# Patient Record
Sex: FEMALE | Race: WHITE | NOT HISPANIC OR LATINO | Employment: FULL TIME | URBAN - METROPOLITAN AREA
[De-identification: names, ages, dates, MRNs, and addresses within clinical notes are randomized per-mention and may not be internally consistent; named-entity substitution may affect disease eponyms.]

---

## 2017-03-01 ENCOUNTER — OFFICE VISIT (OUTPATIENT)
Dept: PSYCHOLOGY | Facility: CLINIC | Age: 52
End: 2017-03-01

## 2017-03-01 DIAGNOSIS — F32.1 MAJOR DEPRESSIVE DISORDER, SINGLE EPISODE, MODERATE (H): Primary | ICD-10-CM

## 2017-03-01 DIAGNOSIS — F43.22 ADJUSTMENT DISORDER WITH ANXIETY: ICD-10-CM

## 2017-03-01 ASSESSMENT — ANXIETY QUESTIONNAIRES
2. NOT BEING ABLE TO STOP OR CONTROL WORRYING: SEVERAL DAYS
IF YOU CHECKED OFF ANY PROBLEMS ON THIS QUESTIONNAIRE, HOW DIFFICULT HAVE THESE PROBLEMS MADE IT FOR YOU TO DO YOUR WORK, TAKE CARE OF THINGS AT HOME, OR GET ALONG WITH OTHER PEOPLE: VERY DIFFICULT
GAD7 TOTAL SCORE: 8
1. FEELING NERVOUS, ANXIOUS, OR ON EDGE: SEVERAL DAYS
7. FEELING AFRAID AS IF SOMETHING AWFUL MIGHT HAPPEN: SEVERAL DAYS
5. BEING SO RESTLESS THAT IT IS HARD TO SIT STILL: SEVERAL DAYS
3. WORRYING TOO MUCH ABOUT DIFFERENT THINGS: MORE THAN HALF THE DAYS
6. BECOMING EASILY ANNOYED OR IRRITABLE: SEVERAL DAYS

## 2017-03-01 ASSESSMENT — PATIENT HEALTH QUESTIONNAIRE - PHQ9: 5. POOR APPETITE OR OVEREATING: SEVERAL DAYS

## 2017-03-01 NOTE — PROGRESS NOTES
"Behavioral Health Progress Note    Client Name: Mary Gill    Service Type: Individual  Length of Visit: 40 minutes  Attendees: Patient    Identifying Information and Presenting Problem:  The patient is a 51 year old American female who is being seen for problematic anxious and depressive symptoms.    Treatment Objective(s) Addressed in This Session:  Review of symptoms/experiences since initial visit on 11/18/16  Adjustment Difficulties: Development of adaptive coping/problem-solving skills to facilitate more adaptive adjustment    Progress on / Status of Treatment Objective(s) / Homework:  Achieved/Completed  New Objective established this session     PHQ-9 SCORE 11/15/2016 11/18/2016 3/1/2017   Total Score 9 14 18     ELENA-7 SCORE 11/15/2016 11/18/2016 3/1/2017   Total Score 11 15 8     Topics Discussed/Interventions Provided:    Reviewed symptoms and experiences between visits. Initial visit with this provider was on 11/18/16, at which time a diagnostic assessment had been completed. 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 Reported feeling that \"people around [her] are falling apart.\" Indicated she had been experiencing significant difficulties with both sleep (waking up in the middle of the night) in addition to concentration. Reported feeling frustrated with \"not being able to concentrate\" and that \"it feels like [her] brain is so markedly different\" in regards to functioning. Provided brief psychoeducation regarding the impact of stress on concentration, which the patient reported finding beneficial.       Discussed the importance of self-care. Prompted the patient to reflect on recent self-care endeavors she had engaged " "in (e.g., Using a mindfulness application on her phone; [place] 2 [place] 3 [place]) and the protective impact of said endeavors. Appeared receptive to this and to have some insight. Agreed to increase her engagement in self-care activities/endeavors between current and next visit. Additionally, agreed to ponder treatment goals during that time as well. Expressed a desire to \"get help finding a direction.\"     Assessment: The patient appeared to be active and engaged in today's session and was receptive to feedback.     Mental Status: Mary Gill appeared generally alert and oriented. Dress was formal and appropriate to the weather and occasion. Grooming and hygiene were good. Eye contact was good. Speech was of normal volume and rate and was clear, coherent, and relevant. Mood was anxious with congruent affect. Thought processes were relevant, logical and goal-directed. Thought content was WNL with no evidence of psychotic or paranoid features. No evidence of SI/HI or self-harm, intent, or plans. Memory appeared grossly intact. Insight and judgment appeared good and patient exhibited good impulse control during the appointment.     Does the patient appear to be at imminent risk of harm to self/others at this time? No    The session was necessary to address anxious and depressive symptoms that have been interfering with patient's ability to function in academic settings. Ongoing psychotherapy is necessary to stabilize mood, provide counseling, provide psychoeducation and provide support.    Diagnosis (DSM-5):  296.22 Major Depressive Disorder, Single Episode, Moderate   309.24 Adjustment Disorder With anxiety    Plan:  1. Follow up in one week.  2. Work on goals as noted in patient instructions.  3. Utilize crisis resources as needed.      Nathaniel Lombardi, Ph.D.  Behavioral Health Fellow      NOTE: Treatment plan to be completed during next visit.  Diagnostic assessment update due " 11/18/2017.

## 2017-03-01 NOTE — MR AVS SNAPSHOT
After Visit Summary   3/1/2017    Mary Gill    MRN: [id number]           Patient Information     Date Of Birth          1965        Visit Information        Provider Department      3/1/2017 4:00 PM Lombardi, Nathaniel, PhD [facility]        Care Instructions    -[place] directions/horizons you want to steer your ship towards.    -Engage in self-care between visits, even small things (meditation; pedicure; go for walks).    -Give yourself permission to be human.    -Schedule two or three visits with Dashawn (weekly if possible).    Crisis Numbers     Crisis Connection - [phone]   COPE - [phone] ([facility] Mobile Response Team)    [facility] [phone] (Emergency Psychiatric Evaluation)     Or call 911         Follow-ups after your visit        Who to contact     Please call your clinic at [phone] to:    Ask questions about your health    Make or cancel appointments    Discuss your medicines    Learn about your test results    Speak to your doctor   If you have compliments or concerns about an experience at your clinic, or if you wish to file a complaint, please contact [facility] Patient Relations at [phone] or email us at [email or web address]         Additional Information About Your Visit        MyChart Information     [employer] gives you secure access to your electronic health record. If you see a primary care provider, you can also send messages to your care team and make appointments. If you have questions, please call your primary care clinic.  If you do not have a primary care provider, please call [phone] and they will assist you.      [employer] is an electronic gateway that provides easy, online access to your medical records. With [employer], you can request a clinic appointment, read your test results, renew a prescription or communicate with your care team.     To access your existing  account, please contact your [facility] or call [phone] for assistance.        Care EveryWhere ID     This is your Care EveryWhere ID. This could be used by other organizations to access your [place] medical records  [id number]         Blood Pressure from Last 3 Encounters:   11/15/16 124/77   06/28/16 121/85   03/24/16 128/81    Weight from Last 3 Encounters:   11/15/16 86.6 kg (191 lb)   06/28/16 87.8 kg (193 lb 9.6 oz)   03/24/16 88.9 kg (196 lb)              Today, you had the following     No orders found for display       Primary Care Provider Office Phone # Fax #    Ana Campos [phone]       [facility] [address]  [facility] [address]        Thank you!     Thank you for choosing [facility]  for your care. Our goal is always to provide you with excellent care. Hearing back from our patients is one way we can continue to improve our services. Please take a few minutes to complete the written survey that you may receive in the mail after your visit with us. Thank you!             Your Updated Medication List - Protect others around you: Learn how to safely use, store and throw away your medicines at www.disposemymeds.org.          This list is accurate as of: 3/1/17  4:34 PM.  Always use your most recent med list.                   Brand Name Dispense Instructions for use    albuterol 108 (90 BASE) MCG/ACT Inhaler    PROAIR HFA/PROVENTIL HFA/VENTOLIN HFA    1 Inhaler    Inhale 2 puffs into the lungs 4 times daily       DiphenhydrAMINE HCl 2 % Gel    BENADRYL ITCH STOPPING    118 mL    Apply to the affected area       hydrocortisone 1 % cream    CORTAID    30 g    Apply sparingly to affected area three times daily for 14 days.       hydrOXYzine 25 MG tablet    ATARAX    30 tablet    Take 1-2 tablets (25-50 mg) by mouth nightly as needed for anxiety (insomnia)       SUMAtriptan 50 MG tablet    IMITREX    18  tablet    Take 1 tablet (50 mg) by mouth at onset of headache for migraine May repeat dose in 2 hours.  Do not exceed 200 mg in 24 hours       ZYRTEC ALLERGY 10 MG Caps   Generic drug:  cetirizine HCl      Take  by mouth.

## 2017-03-01 NOTE — PATIENT INSTRUCTIONS
-Mark directions/horizons you want to steer your ship towards.    -Engage in self-care between visits, even small things (meditation; pedicure; go for walks).    -Give yourself permission to be human.    -Schedule two or three visits with Dashawn (weekly if possible).    Crisis Numbers     Crisis Connection - [phone]   COPE - [phone] ([facility] Mobile Response Team)    Behavioral Emergency Center [phone] (Emergency Psychiatric Evaluation)     Or call [phone]

## 2017-03-02 ASSESSMENT — ANXIETY QUESTIONNAIRES: GAD7 TOTAL SCORE: 8

## 2017-03-02 ASSESSMENT — PATIENT HEALTH QUESTIONNAIRE - PHQ9: SUM OF ALL RESPONSES TO PHQ QUESTIONS 1-9: 18

## 2017-03-07 NOTE — PROGRESS NOTES
Preceptor Attestation:  I have reviewed and agree with the behavioral health fellow's documentation for this visit.  I did not see the patient.  Supervising Clinical Psychologist:  Karen Mccarty PSYD LP

## 2017-03-08 ENCOUNTER — OFFICE VISIT (OUTPATIENT)
Dept: PSYCHOLOGY | Facility: CLINIC | Age: 52
End: 2017-03-08

## 2017-03-08 DIAGNOSIS — F43.22 ADJUSTMENT DISORDER WITH ANXIETY: ICD-10-CM

## 2017-03-08 DIAGNOSIS — F32.1 MAJOR DEPRESSIVE DISORDER, SINGLE EPISODE, MODERATE (H): Primary | ICD-10-CM

## 2017-03-08 NOTE — PROGRESS NOTES
"Behavioral Health Progress Note    Client Name: Mary Gill    Service Type: Individual  Length of Visit: 50 minutes  Attendees: Patient    Identifying Information and Presenting Problem:  The patient is a 51 year old American female who is being seen for problematic anxious and depressive symptoms.    Treatment Objective(s) Addressed in This Session:  Completion of treatment plan and prioritization of treatment goals    Progress on / Status of Treatment Objective(s) / Homework:  Achieved/Completed    PHQ-9 SCORE 11/15/2016 11/18/2016 3/1/2017   Total Score 9 14 18     ELENA-7 SCORE 11/15/2016 11/18/2016 3/1/2017   Total Score 11 15 8     Topics Discussed/Interventions Provided:    Reviewed symptoms and experiences between visits. Noted her mood had been \"much better\" between previous and current visit. Attributed improved mood to a number of factors (e.g., Engagement in regular/consistent self-care; Satisfaction with recent weather; Increased productivity). Reported ongoing stress associated with multiple life stressors (See previous note from this provider), however, and noted continued frustration/difficulties with concentration.       Completed treatment plan. Built upon discussion from previous visit and identified treatment goals concerning (1) developing adaptive behavioral and cognitive coping strategies for anxious/depressive/stress-related symptoms (both in the moment and more broadly) and (2) identifying and challenging thoughts/thought patterns contributing to anxious and depressive symptoms. Shared with this provider that she has a history of engagement in unhelpful and critical thought patterns that have directly and indirectly contributed to anxious and depressive symptoms. Noted feeling that said thought patterns tended to be exacerbated by the experiencing of various stressors. Provided initial psychoeducation regarding the concept of cognitive restructuring/thought challenging processes, to which " the patient appeared to have a good initial understanding and to which the patient appeared receptive. Agreed to discuss further during next visit.    Assessment: The patient appeared to be active and engaged in today's session and was receptive to feedback.     Mental Status: Mary Gill appeared generally alert and oriented. Dress was casual and appropriate to the weather and occasion. Grooming and hygiene were good. Eye contact was good. Speech was of normal volume and rate and was clear, coherent, and relevant. Mood was neutral with congruent affect. Thought processes were relevant, logical and goal-directed. Thought content was WNL with no evidence of psychotic or paranoid features. No evidence of SI/HI or self-harm, intent, or plans. Memory appeared grossly intact. Insight and judgment appeared good and patient exhibited good impulse control during the appointment.     Does the patient appear to be at imminent risk of harm to self/others at this time? No    The session was necessary to address anxious and depressive symptoms that have been interfering with patient's ability to function in academic settings. Ongoing psychotherapy is necessary to stabilize mood, provide counseling, provide psychoeducation and provide support.    Diagnosis (DSM-5):  296.22 Major Depressive Disorder, Single Episode, Moderate   309.24 Adjustment Disorder With anxiety    Plan:  1. Follow up in one to two weeks.  2. Work on goals as discussed during current visit.  3. Utilize crisis resources as needed.      Nathaniel Lombardi, Ph.D.  Behavioral Health Fellow      NOTE: Treatment plan update due 6/8/2017.  Diagnostic assessment update due 11/18/2017.

## 2017-03-08 NOTE — MR AVS SNAPSHOT
After Visit Summary   3/8/2017    Mary Gill    MRN: [id number]           Patient Information     Date Of Birth          1965        Visit Information        Provider Department      3/8/2017 8:20 AM Lombardi, Nathaniel, PhD [facility]        Care Instructions    Treatment Plan    Client's Name: Mary Gill  YOB: 1965  Today's Date: 3/8/2017   Date Diagnostic Update Due: 11/18/2017    DSM-V Diagnoses: 296.22 Major Depressive Disorder, Single Episode, Moderate _  Adjustment Disorders  309.24 (F43.22) With anxiety    Psychosocial / Contextual Factors:   Patient is experiencing a number of significant life stressors.    WHODAS 2.0 TOTAL SCORES 11/18/2016   Total Score 16     PC-PTSD: 0 /4  CAGE AID: 0 /4    PHQ-9 SCORE 11/15/2016 11/18/2016 3/1/2017   Total Score 9 14 18     ELENA-7 SCORE 11/15/2016 11/18/2016 3/1/2017   Total Score 11 15 8     Anticipated treatment duration: Unknown  Agreed upon meeting frequency: Weekly to Biweekly    Long Term Treatment Goal(s) related to diagnosis / functional impairment(s)  Goal 1: Patient will work with this provider to develop adaptive behavioral and cognitive coping strategies for anxious/depressive/stress-related symptoms.    I will know I've met my goal when I start singing spontaneously more often.      Steps we will take to achieve your goal:    Patient will  identify and utilize behavioral and cognitive strategies to more effectively address anxious/depressive/stress-related symptoms in-the-moment.  Status: New - Date: 3/8/2017     Intervention(s)  Therapist will provide support, psychoeducation and homework assignments as needed.    Goal 2: Patient will work with this provider to identify and challenge thoughts and thought patterns contributing to anxious and depressive symptoms.    I will know I've met my goal when I'm able to get back to sleep at night in a reasonable amount of time.      Steps we will take to  achieve your goal:    Patient will Identify negative self-talk and behaviors: challenge core beliefs, myths, and actions.  Status: New - Date: 3/8/2017     Intervention(s)  Therapist will provide support, psychoeducation and homework assignments as needed.    If you need additional support and care during times that your therapist or PCP are not available, here are some additional resources for you:    Help in a Crisis:    COPE ([facility] Mobile Response Team)  [phone]   Crisis Connection:  [phone]  [facility] Multilingual Crisis Line:  [phone]    Acute Psychiatric Services - [facility]  24 hour crisis walk-in and crisis line  [address], [place]  [phone]    [facility]   [address] [place] [address]   [phone] (for 24 hour crisis consultation)    Monday - Friday 8:00am - 7:00pm  Saturday:  11:00am - 3:00pm  Sunday and Holidays Closed    If you feel at risk of immediate harm, go directly to the Emergency Department.    Client has reviewed and agreed to the above plan.    Nathaniel Lombardi, PhD  March 8, 2017  Behavioral Health Fellow      ______________________________    ________  Patient Signature       Date    ______________________________    ________  Provider Signature       Date    ______________________________                ________  Supervisor Co-Signature      Date          Follow-ups after your visit        Who to contact     Please call your clinic at [phone] to:    Ask questions about your health    Make or cancel appointments    Discuss your medicines    Learn about your test results    Speak to your doctor   If you have compliments or concerns about an experience at your clinic, or if you wish to file a complaint, please contact [facility] Patient Relations at [phone] or email us at [email or web address]         Additional Information About Your Visit        MyChart Information      [employer] gives you secure access to your electronic health record. If you see a primary care provider, you can also send messages to your care team and make appointments. If you have questions, please call your primary care clinic.  If you do not have a primary care provider, please call [phone] and they will assist you.      [employer] is an electronic gateway that provides easy, online access to your medical records. With [employer], you can request a clinic appointment, read your test results, renew a prescription or communicate with your care team.     To access your existing account, please contact your [facility] or call [phone] for assistance.        Care EveryWhere ID     This is your Care EveryWhere ID. This could be used by other organizations to access your [place] medical records  [id number]         Blood Pressure from Last 3 Encounters:   11/15/16 124/77   06/28/16 121/85   03/24/16 128/81    Weight from Last 3 Encounters:   11/15/16 86.6 kg (191 lb)   06/28/16 87.8 kg (193 lb 9.6 oz)   03/24/16 88.9 kg (196 lb)              Today, you had the following     No orders found for display       Primary Care Provider Office Phone # Fax #    Ana Campos [phone]       [facility] [address] [facility] [address]        Thank you!     Thank you for choosing [facility]  for your care. Our goal is always to provide you with excellent care. Hearing back from our patients is one way we can continue to improve our services. Please take a few minutes to complete the written survey that you may receive in the mail after your visit with us. Thank you!             Your Updated Medication List - Protect others around you: Learn how to safely use, store and throw away your medicines at www.disposemymeds.org.          This list is accurate as of: 3/8/17  9:12 AM.  Always use your most recent med list.                    Brand Name Dispense Instructions for use    albuterol 108 (90 BASE) MCG/ACT Inhaler    PROAIR HFA/PROVENTIL HFA/VENTOLIN HFA    1 Inhaler    Inhale 2 puffs into the lungs 4 times daily       DiphenhydrAMINE HCl 2 % Gel    BENADRYL ITCH STOPPING    118 mL    Apply to the affected area       hydrocortisone 1 % cream    CORTAID    30 g    Apply sparingly to affected area three times daily for 14 days.       hydrOXYzine 25 MG tablet    ATARAX    30 tablet    Take 1-2 tablets (25-50 mg) by mouth nightly as needed for anxiety (insomnia)       SUMAtriptan 50 MG tablet    IMITREX    18 tablet    Take 1 tablet (50 mg) by mouth at onset of headache for migraine May repeat dose in 2 hours.  Do not exceed 200 mg in 24 hours       ZYRTEC ALLERGY 10 MG Caps   Generic drug:  cetirizine HCl      Take  by mouth.

## 2017-03-08 NOTE — PATIENT INSTRUCTIONS
Treatment Plan    Client's Name: Mary Gill  YOB: 1965  Today's Date: 3/8/2017   Date Diagnostic Update Due: 11/18/2017    DSM-V Diagnoses: 296.22 Major Depressive Disorder, Single Episode, Moderate _  Adjustment Disorders  309.24 (F43.22) With anxiety    Psychosocial / Contextual Factors:   Patient is experiencing a number of significant life stressors.    WHODAS 2.0 TOTAL SCORES 11/18/2016   Total Score 16     PC-PTSD: 0 /4  CAGE AID: 0 /4    PHQ-9 SCORE 11/15/2016 11/18/2016 3/1/2017   Total Score 9 14 18     ELENA-7 SCORE 11/15/2016 11/18/2016 3/1/2017   Total Score 11 15 8     Anticipated treatment duration: Unknown  Agreed upon meeting frequency: Weekly to Biweekly    Long Term Treatment Goal(s) related to diagnosis / functional impairment(s)  Goal 1: Patient will work with this provider to develop adaptive behavioral and cognitive coping strategies for anxious/depressive/stress-related symptoms.    I will know I've met my goal when I start singing spontaneously more often.      Steps we will take to achieve your goal:    Patient will  identify and utilize behavioral and cognitive strategies to more effectively address anxious/depressive/stress-related symptoms in-the-moment.  Status: New - Date: 3/8/2017     Intervention(s)  Therapist will provide support, psychoeducation and homework assignments as needed.    Goal 2: Patient will work with this provider to identify and challenge thoughts and thought patterns contributing to anxious and depressive symptoms.    I will know I've met my goal when I'm able to get back to sleep at night in a reasonable amount of time.      Steps we will take to achieve your goal:    Patient will Identify negative self-talk and behaviors: challenge core beliefs, myths, and actions.  Status: New - Date: 3/8/2017     Intervention(s)  Therapist will provide support, psychoeducation and homework assignments as needed.    If you need additional support and care during  times that your therapist or PCP are not available, here are some additional resources for you:    Help in a Crisis:    [facility] ([facility] Mobile Response Team)  [phone]   Crisis Connection:  [phone]  [facility] Multilingual Crisis Line:  [phone]    Acute Psychiatric Services - [facility]  24 hour crisis walk-in and crisis line  [address] [place], [place]  [phone]    [facility]   [address] [place] [address]   [phone] (for 24 hour crisis consultation)    Monday - Friday 8:00am - 7:00pm  Saturday:  11:00am - 3:00pm  Sunday and Holidays Closed    If you feel at risk of immediate harm, go directly to the Emergency Department.    Client has reviewed and agreed to the above plan.    Nathaniel Lombardi, PhD  March 8, 2017  Behavioral Health Fellow      ______________________________    ________  Patient Signature       Date    ______________________________    ________  Provider Signature       Date    ______________________________                ________  Supervisor Co-Signature      Date

## 2017-03-22 ENCOUNTER — OFFICE VISIT (OUTPATIENT)
Dept: PSYCHOLOGY | Facility: CLINIC | Age: 52
End: 2017-03-22

## 2017-03-22 DIAGNOSIS — F43.22 ADJUSTMENT DISORDER WITH ANXIETY: ICD-10-CM

## 2017-03-22 DIAGNOSIS — F32.1 MAJOR DEPRESSIVE DISORDER, SINGLE EPISODE, MODERATE (H): Primary | ICD-10-CM

## 2017-03-22 NOTE — MR AVS SNAPSHOT
After Visit Summary   3/22/2017    Mary Gill    MRN: [id number]           Patient Information     Date Of Birth          1965        Visit Information        Provider Department      3/22/2017 8:20 AM Lombardi, Nathaniel, PhD [facility]        Care Instructions    -Practice mindfulness (i.e., Mindful breathing; Mindful walking) daily between visits.     -Continue engaging in self-care between visits, even small things (meditation; pedicure; go for walks).    -Give yourself permission to be human.          Follow-ups after your visit        Your next 10 appointments already scheduled     Mar 29, 2017  9:40 AM CDT   Return Visit with Nathaniel Lombardi, PhD   [facility] ([facility])    [address],  [address]  [place] [address]   [phone]              Who to contact     Please call your clinic at [phone] to:    Ask questions about your health    Make or cancel appointments    Discuss your medicines    Learn about your test results    Speak to your doctor   If you have compliments or concerns about an experience at your clinic, or if you wish to file a complaint, please contact [facility] Patient Relations at [phone] or email us at [email or web address]         Additional Information About Your Visit        MyChart Information     [employer] gives you secure access to your electronic health record. If you see a primary care provider, you can also send messages to your care team and make appointments. If you have questions, please call your primary care clinic.  If you do not have a primary care provider, please call [phone] and they will assist you.      [employer] is an electronic gateway that provides easy, online access to your medical records. With [employer], you can request a clinic appointment, read your test results, renew a prescription or communicate with your care team.      To access your existing account, please contact your [facility] or call [phone] for assistance.        Care EveryWhere ID     This is your Care EveryWhere ID. This could be used by other organizations to access your [place] medical records  [id number]         Blood Pressure from Last 3 Encounters:   11/15/16 124/77   06/28/16 121/85   03/24/16 128/81    Weight from Last 3 Encounters:   11/15/16 86.6 kg (191 lb)   06/28/16 87.8 kg (193 lb 9.6 oz)   03/24/16 88.9 kg (196 lb)              Today, you had the following     No orders found for display       Primary Care Provider Office Phone # Fax #    Ana Campos [phone]       [facility] [address] [facility] [address]        Thank you!     Thank you for choosing [facility]  for your care. Our goal is always to provide you with excellent care. Hearing back from our patients is one way we can continue to improve our services. Please take a few minutes to complete the written survey that you may receive in the mail after your visit with us. Thank you!             Your Updated Medication List - Protect others around you: Learn how to safely use, store and throw away your medicines at www.disposemymeds.org.          This list is accurate as of: 3/22/17  8:59 AM.  Always use your most recent med list.                   Brand Name Dispense Instructions for use    albuterol 108 (90 BASE) MCG/ACT Inhaler    PROAIR HFA/PROVENTIL HFA/VENTOLIN HFA    1 Inhaler    Inhale 2 puffs into the lungs 4 times daily       DiphenhydrAMINE HCl 2 % Gel    BENADRYL ITCH STOPPING    118 mL    Apply to the affected area       hydrocortisone 1 % cream    CORTAID    30 g    Apply sparingly to affected area three times daily for 14 days.       hydrOXYzine 25 MG tablet    ATARAX    30 tablet    Take 1-2 tablets (25-50 mg) by mouth nightly as needed for anxiety (insomnia)       SUMAtriptan 50 MG  tablet    IMITREX    18 tablet    Take 1 tablet (50 mg) by mouth at onset of headache for migraine May repeat dose in 2 hours.  Do not exceed 200 mg in 24 hours       ZYRTEC ALLERGY 10 MG Caps   Generic drug:  cetirizine HCl      Take  by mouth.

## 2017-03-22 NOTE — PROGRESS NOTES
"Behavioral Health Progress Note    Client Name: Mary Gill    Service Type: Individual  Length of Visit: 30 minutes  Attendees: Patient    Identifying Information and Presenting Problem:  The patient is a 51 year old American female who is being seen for problematic anxious and depressive symptoms.    Treatment Objective(s) Addressed in This Session:  Adjustment Difficulties: will develop coping/problem-solving skills to facilitate more adaptive adjustment    Progress on / Status of Treatment Objective(s) / Homework:  Satisfactory progress     PHQ-9 SCORE 11/15/2016 11/18/2016 3/1/2017   Total Score 9 14 18     ELENA-7 SCORE 11/15/2016 11/18/2016 3/1/2017   Total Score 11 15 8     Topics Discussed/Interventions Provided:    Reviewed symptoms and experiences between visits. Arrived approximately 10 minutes late for visit due to traffic. Noted her mood had been \"better [she thinks]\" between previous and current visit, and indicated she had continued her engagement in self-care activities/endeavors. Had gone out of town for several days with friends between previous and current visit, and during the trip made a conscious decision to \"put [her] stress aside.\" Reported realizing she had been experiencing significant stress and worry during the trip, \"an eternal to-do list,\" and that \"it felt like an epiphany of sorts.\" Had experienced difficulties setting aside her stress and worry, however, and reported feeling \"[she doesn't] have that skill.\"       Discussed the concept of mindfulness. Provided initial psychoeducation regarding the concept of mindfulness as an adaptive skill/strategy for coping with stress and worry in-the-moment. Appeared receptive. Completed a mindful breathing exercise during the current visit, and reflected on the experience. Had observed herself \"making a lot of judgments\" and experiencing evaluative thoughts. Agreed to engage in daily mindfulness exercises between current and next visit and to " continue discussion during next visit.    Assessment: The patient appeared to be active and engaged in today's session and was receptive to feedback.     Mental Status: Mary Gill appeared generally alert and oriented. Dress was casual and appropriate to the weather and occasion. Grooming and hygiene were good. Eye contact was good. Speech was of normal volume and rate and was clear, coherent, and relevant. Mood was euthymic with congruent affect. Thought processes were relevant, logical and goal-directed. Thought content was WNL with no evidence of psychotic or paranoid features. No evidence of SI/HI or self-harm, intent, or plans. Memory appeared grossly intact. Insight and judgment appeared good and patient exhibited good impulse control during the appointment.     Does the patient appear to be at imminent risk of harm to self/others at this time? No    The session was necessary to address anxious and depressive symptoms that have been interfering with patient's ability to function in academic settings. Ongoing psychotherapy is necessary to stabilize mood, provide counseling, provide psychoeducation and provide support.    Diagnosis (DSM-5):  296.22 Major Depressive Disorder, Single Episode, Moderate   309.24 Adjustment Disorder With anxiety    Plan:  1. Follow up in one week.  2. Work on goals as discussed during current visit.  3. Utilize crisis resources as needed.      Nathaniel Lombardi, Ph.D.  Behavioral Health Fellow      NOTE: Treatment plan update due 6/8/2017.  Diagnostic assessment update due 11/18/2017.

## 2017-03-22 NOTE — PATIENT INSTRUCTIONS
-Practice mindfulness (i.e., Mindful breathing; Mindful walking) daily between visits.     -Continue engaging in self-care between visits, even small things (meditation; pedicure; go for walks).    -Give yourself permission to be human.

## 2017-03-29 ENCOUNTER — OFFICE VISIT (OUTPATIENT)
Dept: PSYCHOLOGY | Facility: CLINIC | Age: 52
End: 2017-03-29

## 2017-03-29 DIAGNOSIS — F43.22 ADJUSTMENT DISORDER WITH ANXIETY: ICD-10-CM

## 2017-03-29 DIAGNOSIS — F32.1 MAJOR DEPRESSIVE DISORDER, SINGLE EPISODE, MODERATE (H): Primary | ICD-10-CM

## 2017-03-29 NOTE — PATIENT INSTRUCTIONS
-Schedule follow-up visits with Dashawn for April  -Try out the 3Cs (Catch it; Check it; Change it)  -Continue doing mindful activities between visits    Crisis Numbers:   Crisis Connection - [phone]   COPE - [phone] ([facility] Mobile Response Team)  [facility] [phone] (Emergency Psychiatric Evaluation)   Or call 911     Additional resources:  [place] Warmline  [place] = [phone]  Toll free = [phone]    Bereavement, Grief, and Mourning    Bereavement is the state of having lost a significant other to death. Grief is the personal response to the loss. Mourning is the public expression of that loss.    What Is  Normal  Grief?  Grief reactions vary depending on who we are, who we lost, our relationship with that person, the circumstances around their passing, and how much their loss affects our day-to-day functioning. Different people may express grief differently; you may even have different grief responses between one loss and another. Reactions to grief and loss include not just emotional symptoms but also behavioral and physical symptoms. These reactions often change over time. All are normal for a short period. The following are some of the symptoms you may experience:      Emotional. Shock, denial, numbness, sadness, anxiety, guilt, fear, anger, irritability.    Behavioral. Crying unexpectedly, sleep changes, not eating, withdrawing from others, restlessness, trouble making decisions.    Physical. Concentration problems, exhaustion or fatigue, decreased energy, memory problems, upset stomach, pain, and headaches. Symptoms that are not normal and may signal the need to talk to a professional include, use of drugs or alcohol, violence, and thoughts of killing oneself.    Duration  The duration of grief varies from person to person. Research shows that the average recovery time is 18 to 24 months. Grief reactions can be stronger around significant dates, like the  anniversary of the person s death, birthdays, and holidays.    Giving Yourself Time to Grieve  It is normal and important to express your grief and to work through the concerns that arise for you at this time. Avoiding your feelings may not be helpful and may delay or prolong your grief. The following are some suggestions that may help you:      Find supportive people to reach out to during your grief. This is the time when the support of others may be the most helpful. Don t be afraid to tell them how they can best help, even if it means just listening. It is often very helpful to talk about your loss with people who will allow you to express your emotions.    Take care of your health. Often after a loss, we stop doing the things we need to for health care, such as exercising, eating correctly, or taking prescribed medications. If you are on a health care regimen, it is important to continue to follow that plan.    Postpone major life changes. Give yourself time to adjust to your loss before making plans to change jobs, move or sell your home, remarry, and so forth. Grief can sometimes cloud your judgment and ability to make decisions.    Consider keeping a journal. It is often helpful, as a way to work through your feelings, to write or tell the story of your loss and what it means to you.     Participate in activities. Staying active through exercise, enjoyable activities, outings with supportive others, or starting new hobbies can help you get through tough times while providing opportunities for constructive development and use of energy.    Find a way to memorialize your loved one. Planting a tree or garden in the name of your loved one, dedicating a work to their memory, contributing to a rosey in their name, and other such activities can be helpful.    Consider joining a grief-support group or contacting a grief counselor for additional support and help. Remember that depressive symptoms (e.g., feeling  sad) are a fundamental part of normal bereavement. Staying active and finding support from others can help you to work through the grief process.    DREW Alcala., CLAIRE Nicholson., BELL Hernandez., & FROYLAN Butcher. (2009). Integrated Behavioral [place] in Primary Care: Step-by-Step Guidance for Assessment and Intervention. American Psychological Association.

## 2017-03-29 NOTE — MR AVS SNAPSHOT
After Visit Summary   3/29/2017    Mary Gill    MRN: [id number]           Patient Information     Date Of Birth          1965        Visit Information        Provider Department      3/29/2017 9:40 AM Lombardi, Nathaniel, PhD [facility]        Care Instructions    -Schedule follow-up visits with Dashawn for April  -Try out the 3Cs (Catch it; Check it; Change it)  -Continue doing mindful activities between visits    Crisis Numbers:   Crisis Connection - [phone]   COPE - [phone] ([facility] Mobile Response Team)  [facility] [phone] (Emergency Psychiatric Evaluation)   Or call 911     Additional resources:  [place] Warmline  [place] = [phone]  Toll free = [phone]    Bereavement, Grief, and Mourning    Bereavement is the state of having lost a significant other to death. Grief is the personal response to the loss. Mourning is the public expression of that loss.    What Is  Normal  Grief?  Grief reactions vary depending on who we are, who we lost, our relationship with that person, the circumstances around their passing, and how much their loss affects our day-to-day functioning. Different people may express grief differently; you may even have different grief responses between one loss and another. Reactions to grief and loss include not just emotional symptoms but also behavioral and physical symptoms. These reactions often change over time. All are normal for a short period. The following are some of the symptoms you may experience:      Emotional. Shock, denial, numbness, sadness, anxiety, guilt, fear, anger, irritability.    Behavioral. Crying unexpectedly, sleep changes, not eating, withdrawing from others, restlessness, trouble making decisions.    Physical. Concentration problems, exhaustion or fatigue, decreased energy, memory problems, upset stomach, pain, and headaches. Symptoms that are not normal and may signal the need  to talk to a professional include, use of drugs or alcohol, violence, and thoughts of killing oneself.    Duration  The duration of grief varies from person to person. Research shows that the average recovery time is 18 to 24 months. Grief reactions can be stronger around significant dates, like the anniversary of the person s death, birthdays, and holidays.    Giving Yourself Time to Grieve  It is normal and important to express your grief and to work through the concerns that arise for you at this time. Avoiding your feelings may not be helpful and may delay or prolong your grief. The following are some suggestions that may help you:      Find supportive people to reach out to during your grief. This is the time when the support of others may be the most helpful. Don t be afraid to tell them how they can best help, even if it means just listening. It is often very helpful to talk about your loss with people who will allow you to express your emotions.    Take care of your health. Often after a loss, we stop doing the things we need to for health care, such as exercising, eating correctly, or taking prescribed medications. If you are on a health care regimen, it is important to continue to follow that plan.    Postpone major life changes. Give yourself time to adjust to your loss before making plans to change jobs, move or sell your home, remarry, and so forth. Grief can sometimes cloud your judgment and ability to make decisions.    Consider keeping a journal. It is often helpful, as a way to work through your feelings, to write or tell the story of your loss and what it means to you.     Participate in activities. Staying active through exercise, enjoyable activities, outings with supportive others, or starting new hobbies can help you get through tough times while providing opportunities for constructive development and use of energy.    Find a way to memorialize your loved one. Planting a tree or garden in the  name of your loved one, dedicating a work to their memory, contributing to a rosey in their name, and other such activities can be helpful.    Consider joining a grief-support group or contacting a grief counselor for additional support and help. Remember that depressive symptoms (e.g., feeling sad) are a fundamental part of normal bereavement. Staying active and finding support from others can help you to work through the grief process.    DREW Alcala., CLAIRE Nicholson., BELL Hernandez., & FROYLAN Butcher (2009). Integrated Behavioral Carlos in Primary Care: Step-by-Step Guidance for Assessment and Intervention. American Psychological Association.         Follow-ups after your visit        Who to contact     Please call your clinic at [phone] to:    Ask questions about your health    Make or cancel appointments    Discuss your medicines    Learn about your test results    Speak to your doctor   If you have compliments or concerns about an experience at your clinic, or if you wish to file a complaint, please contact [facility] Patient Relations at [phone] or email us at [email or web address]         Additional Information About Your Visit        [employer] Information     [employer] gives you secure access to your electronic health record. If you see a primary care provider, you can also send messages to your care team and make appointments. If you have questions, please call your primary care clinic.  If you do not have a primary care provider, please call [phone] and they will assist you.      [employer] is an electronic gateway that provides easy, online access to your medical records. With [employer], you can request a clinic appointment, read your test results, renew a prescription or communicate with your care team.     To access your existing account, please contact your [facility] or call [phone] for assistance.        Care EveryWhere ID      This is your Care EveryWhere ID. This could be used by other organizations to access your [place] medical records  [id number]         Blood Pressure from Last 3 Encounters:   11/15/16 124/77   06/28/16 121/85   03/24/16 128/81    Weight from Last 3 Encounters:   11/15/16 86.6 kg (191 lb)   06/28/16 87.8 kg (193 lb 9.6 oz)   03/24/16 88.9 kg (196 lb)              Today, you had the following     No orders found for display       Primary Care Provider Office Phone # Fax #    Ana Campos [phone]       [facility] [address] [facility] [address]        Thank you!     Thank you for choosing [facility]  for your care. Our goal is always to provide you with excellent care. Hearing back from our patients is one way we can continue to improve our services. Please take a few minutes to complete the written survey that you may receive in the mail after your visit with us. Thank you!             Your Updated Medication List - Protect others around you: Learn how to safely use, store and throw away your medicines at www.disposemymeds.org.          This list is accurate as of: 3/29/17 10:21 AM.  Always use your most recent med list.                   Brand Name Dispense Instructions for use    albuterol 108 (90 BASE) MCG/ACT Inhaler    PROAIR HFA/PROVENTIL HFA/VENTOLIN HFA    1 Inhaler    Inhale 2 puffs into the lungs 4 times daily       DiphenhydrAMINE HCl 2 % Gel    BENADRYL ITCH STOPPING    118 mL    Apply to the affected area       hydrocortisone 1 % cream    CORTAID    30 g    Apply sparingly to affected area three times daily for 14 days.       hydrOXYzine 25 MG tablet    ATARAX    30 tablet    Take 1-2 tablets (25-50 mg) by mouth nightly as needed for anxiety (insomnia)       SUMAtriptan 50 MG tablet    IMITREX    18 tablet    Take 1 tablet (50 mg) by mouth at onset of headache for migraine May repeat dose in 2 hours.  Do not exceed 200 mg in 24 hours        ZYRTEC ALLERGY 10 MG Caps   Generic drug:  cetirizine HCl      Take  by mouth.

## 2017-03-30 NOTE — PROGRESS NOTES
"Behavioral Health Progress Note    Client Name: Mary Gill    Service Type: Individual  Length of Visit: 40 minutes  Attendees: Patient    Identifying Information and Presenting Problem:  The patient is a 51 year old American female who is being seen for problematic anxious and depressive symptoms.    Treatment Objective(s) Addressed in This Session:  Adjustment Difficulties: will develop coping/problem-solving skills to facilitate more adaptive adjustment    Progress on / Status of Treatment Objective(s) / Homework:  Satisfactory progress     PHQ-9 SCORE 11/15/2016 11/18/2016 3/1/2017   Total Score 9 14 18     ELENA-7 SCORE 11/15/2016 11/18/2016 3/1/2017   Total Score 11 15 8     Topics Discussed/Interventions Provided:    Reviewed symptoms and experiences between visits. Experienced a number of additional stressors and difficult experiences, in addition to ongoing stressors, since previous visit. Shared that a friend of her son committed suicide last week and that she and her family \"are dealing with all that.\" Additionally, experiencing stress associated with a friend struggling with significant depressive symptoms repeatedly reaching out for support. Of note, reported occasional engagement in mindfulness exercises, and agreed to continue engaging in exercises daily moving forward.       Processed the recent death of her son's friend. Observed that her reaction to the loss was multifaceted. In addition to her personal reaction, had been experiencing significant concern regarding her son's reaction and feeling \"like [she needs] a clear solution\" to help her son cope. Provided brief psychoeducation regarding the concepts of bereavement/grief/morning and provided the patient with an informational handout regarding the concepts. Expressed appreciation.       Discussed stress associated with friend reaching out for support. Shared her friend \"is having a tough time with depression\" and is uncertain about \"what to do " "with it.\" Experiencing conflictual emotions/feelings between being concerned that she isn't doing enough, while feeling uncertain about what she could feasibly do to help. Expressed concern that if something were to happen (e.g., If her friend were to become acutely distressed in the future and attempt suicide) she would feel guilt about not having done enough to prevent it. Noted \"[she wants] to feel like [she's] done what [she] can,\" and shared her concern that failing to do so would mean \"[she's] not doing what [she] was put on this earth for.\" Considered this concern within the context of cognitive restructuring/thought challenging processes (i.e., 3Cs: Catch it; Check it; Change it), which she appeared to have a good initial understanding. Prior to conclusion of current visit, inquired about crisis resources she would be able to provide to her friend. Provided the patient with crisis resources (See patient instructions), to which she expressed appreciation.     Assessment: The patient appeared to be active and engaged in today's session and was receptive to feedback.     Mental Status: Mary Gill appeared generally alert and oriented. Dress was casual and appropriate to the weather and occasion. Grooming and hygiene were good. Eye contact was good. Speech was of normal volume and rate and was clear, coherent, and relevant. Mood was neutral with congruent affect. Thought processes were relevant, logical and goal-directed. Thought content was WNL with no evidence of psychotic or paranoid features. No evidence of SI/HI or self-harm, intent, or plans. Memory appeared grossly intact. Insight and judgment appeared good and patient exhibited good impulse control during the appointment.     Does the patient appear to be at imminent risk of harm to self/others at this time? No    The session was necessary to address anxious and depressive symptoms that have been interfering with patient's ability to function in " [facility] settings. Ongoing psychotherapy is necessary to stabilize mood, provide counseling, provide psychoeducation and provide support.    Diagnosis (DSM-5):  296.22 Major Depressive Disorder, Single Episode, Moderate   309.24 Adjustment Disorder With anxiety    Plan:  1. Follow up in one or two weeks.  2. Work on goals as discussed during current visit.  3. Utilize crisis resources as needed.      Nathaniel Lombardi, Ph.D.  Behavioral Health Fellow      NOTE: Treatment plan update due 6/8/2017.  Diagnostic assessment update due 11/18/2017.

## 2017-04-26 ENCOUNTER — OFFICE VISIT (OUTPATIENT)
Dept: PSYCHOLOGY | Facility: CLINIC | Age: 52
End: 2017-04-26

## 2017-04-26 DIAGNOSIS — F43.22 ADJUSTMENT DISORDER WITH ANXIETY: ICD-10-CM

## 2017-04-26 DIAGNOSIS — F32.4 MAJOR DEPRESSIVE DISORDER, SINGLE EPISODE, IN PARTIAL REMISSION (H): Primary | ICD-10-CM

## 2017-04-26 ASSESSMENT — ANXIETY QUESTIONNAIRES
3. WORRYING TOO MUCH ABOUT DIFFERENT THINGS: NOT AT ALL
6. BECOMING EASILY ANNOYED OR IRRITABLE: NOT AT ALL
5. BEING SO RESTLESS THAT IT IS HARD TO SIT STILL: NOT AT ALL
1. FEELING NERVOUS, ANXIOUS, OR ON EDGE: MORE THAN HALF THE DAYS
2. NOT BEING ABLE TO STOP OR CONTROL WORRYING: SEVERAL DAYS
7. FEELING AFRAID AS IF SOMETHING AWFUL MIGHT HAPPEN: NOT AT ALL
IF YOU CHECKED OFF ANY PROBLEMS ON THIS QUESTIONNAIRE, HOW DIFFICULT HAVE THESE PROBLEMS MADE IT FOR YOU TO DO YOUR WORK, TAKE CARE OF THINGS AT HOME, OR GET ALONG WITH OTHER PEOPLE: NOT DIFFICULT AT ALL
GAD7 TOTAL SCORE: 3

## 2017-04-26 ASSESSMENT — PATIENT HEALTH QUESTIONNAIRE - PHQ9: 5. POOR APPETITE OR OVEREATING: NOT AT ALL

## 2017-04-26 NOTE — PATIENT INSTRUCTIONS
-Schedule follow-up visits with Dashawn for May    -Continue trying out the 3Cs (Catch it; Check it; Change it)    -Continue doing mindful activities between visits    -Be sure to enjoy the  food, pedicure, and white yarn.

## 2017-04-26 NOTE — MR AVS SNAPSHOT
After Visit Summary   4/26/2017    Mary Gill    MRN: [id number]           Patient Information     Date Of Birth          1965        Visit Information        Provider Department      4/26/2017 4:00 PM Lombardi, Nathaniel, PhD [facility]        Care Instructions    -Schedule follow-up visits with Dashawn for May    -Continue trying out the 3Cs (Catch it; Check it; Change it)    -Continue doing mindful activities between visits    -Be sure to enjoy the  food, pedicure, and white yarn.             Follow-ups after your visit        Who to contact     Please call your clinic at [phone] to:    Ask questions about your health    Make or cancel appointments    Discuss your medicines    Learn about your test results    Speak to your doctor   If you have compliments or concerns about an experience at your clinic, or if you wish to file a complaint, please contact [facility] Patient Relations at [phone] or email us at [email or web address]         Additional Information About Your Visit        MyChart Information     [employer] gives you secure access to your electronic health record. If you see a primary care provider, you can also send messages to your care team and make appointments. If you have questions, please call your primary care clinic.  If you do not have a primary care provider, please call [phone] and they will assist you.      [employer] is an electronic gateway that provides easy, online access to your medical records. With [employer], you can request a clinic appointment, read your test results, renew a prescription or communicate with your care team.     To access your existing account, please contact your [facility] or call [phone] for assistance.        Care EveryWhere ID     This is your Care EveryWhere ID. This could be used by other organizations to access your [facility]  records  [id number]         Blood Pressure from Last 3 Encounters:   11/15/16 124/77   06/28/16 121/85   03/24/16 128/81    Weight from Last 3 Encounters:   11/15/16 86.6 kg (191 lb)   06/28/16 87.8 kg (193 lb 9.6 oz)   03/24/16 88.9 kg (196 lb)              Today, you had the following     No orders found for display       Primary Care Provider Office Phone # Fax #    Ana Campos [phone]       [facility] [address] [facility] [address]        Thank you!     Thank you for choosing [facility]  for your care. Our goal is always to provide you with excellent care. Hearing back from our patients is one way we can continue to improve our services. Please take a few minutes to complete the written survey that you may receive in the mail after your visit with us. Thank you!             Your Updated Medication List - Protect others around you: Learn how to safely use, store and throw away your medicines at www.disposemymeds.org.          This list is accurate as of: 4/26/17  4:50 PM.  Always use your most recent med list.                   Brand Name Dispense Instructions for use    albuterol 108 (90 BASE) MCG/ACT Inhaler    PROAIR HFA/PROVENTIL HFA/VENTOLIN HFA    1 Inhaler    Inhale 2 puffs into the lungs 4 times daily       DiphenhydrAMINE HCl 2 % Gel    BENADRYL ITCH STOPPING    118 mL    Apply to the affected area       hydrocortisone 1 % cream    CORTAID    30 g    Apply sparingly to affected area three times daily for 14 days.       hydrOXYzine 25 MG tablet    ATARAX    30 tablet    Take 1-2 tablets (25-50 mg) by mouth nightly as needed for anxiety (insomnia)       SUMAtriptan 50 MG tablet    IMITREX    18 tablet    Take 1 tablet (50 mg) by mouth at onset of headache for migraine May repeat dose in 2 hours.  Do not exceed 200 mg in 24 hours       ZYRTEC ALLERGY 10 MG Caps   Generic drug:  cetirizine HCl      Take  by mouth.

## 2017-04-26 NOTE — PROGRESS NOTES
"Behavioral Health Progress Note    Client Name: Mary Gill    Service Type: Individual  Length of Visit: 45 minutes  Attendees: Patient    Identifying Information and Presenting Problem:  The patient is a 51 year old American female who is being seen for problematic anxious and depressive symptoms.    Treatment Objective(s) Addressed in This Session:  Anxiety: will develop healthy cognitive patterns and beliefs    Progress on / Status of Treatment Objective(s) / Homework:  Satisfactory progress     PHQ-9 SCORE 11/18/2016 3/1/2017 4/26/2017   Total Score 14 18 5     ELENA-7 SCORE 11/18/2016 3/1/2017 4/26/2017   Total Score 15 8 3     Topics Discussed/Interventions Provided:    Reviewed symptoms and experiences between visits. Has experienced improved mood/depressive symptoms since previous visit. Reported feeling her mood has been consistently improved recently, though noted \"[she's] still not sleeping much.\" Additionally, endorsed decreased severity of anxious symptoms since previous visit. Continued experiencing of multiple significant life stressors, and associated distress/stress, since previous visit.       Discussed the importance of self-care. Reflected on and processed the patient s ongoing stressors, and discussed the protective impact of self-care engagement. Appeared receptive and to have good insight. Identified self-care activities the patient planned to utilize between the current and next visit (See patient instructions). Agreed to check-in on the patient's self-care engagement endeavors during the next visit. Additionally, agreed to continue engagement in mindful activities.       Continued discussion of stress associated with friend reaching out for support. Expressed interest in continuing discussion from the previous visit. Reported experiencing ongoing conflictual emotions/feelings, and noted more recently that \"[she's] feeling manipulated\" by her friend. Explored the patient's observation, " "during which the patient noted she has been \"seeing a pattern\" in her friend's outreach efforts and the patient's subsequent responses that may be maintained in part by the patient's concern about not doing enough. Indicated she planned to continue reflecting on this between the current and next visit.      Continued discussion of cognitive restructuring/thought challenging within context of recent frustrations with family. Has been planning a family trip to take with her father in light of his worsening health (i.e., Visiting a war memorial to get his name added). Expressed significant frustration that her mother and sister have been uninvolved in the planning and have appeared uninterested in participating. Noted \"they should give him the attention and dignity he's owed.\" Considered this statement, and the patient's frustration, within the context of cognitive restructuring/thought challenging. Appeared receptive and to have continued understanding, noting \"[her] other family members may have different values\" impacting their decisions and feelings regarding the trip.     Assessment: The patient appeared to be active and engaged in today's session and was receptive to feedback.     Mental Status: Mary Gill appeared generally alert and oriented. Dress was formal and appropriate to the weather and occasion. Grooming and hygiene were good. Eye contact was good. Speech was of normal volume and rate and was clear, coherent, and relevant. Mood was euthymic with congruent affect. Thought processes were relevant, logical and goal-directed. Thought content was WNL with no evidence of psychotic or paranoid features. No evidence of SI/HI or self-harm, intent, or plans. Memory appeared grossly intact. Insight and judgment appeared good and patient exhibited good impulse control during the appointment.     Does the patient appear to be at imminent risk of harm to self/others at this time? 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 Ongoing psychotherapy is necessary to stabilize mood, provide counseling, provide psychoeducation and provide support.    Diagnosis (DSM-5):  296.25 Major Depressive Disorder, Single Episode, In Partial Remission  309.24 Adjustment Disorder With Anxiety    Plan:  1. Follow up in one or two weeks.  2. Work on goals as discussed during current visit.  3. Utilize crisis resources as needed.      Nathaniel Lombardi, Ph.D.  Behavioral Health Fellow       NOTE: Treatment plan update due 6/8/2017.  Diagnostic assessment update due 11/18/2017.

## 2017-04-27 ASSESSMENT — ANXIETY QUESTIONNAIRES: GAD7 TOTAL SCORE: 3

## 2017-04-27 ASSESSMENT — PATIENT HEALTH QUESTIONNAIRE - PHQ9: SUM OF ALL RESPONSES TO PHQ QUESTIONS 1-9: 5

## 2017-07-21 ENCOUNTER — ALLIED HEALTH/NURSE VISIT (OUTPATIENT)
Dept: FAMILY MEDICINE | Facility: CLINIC | Age: 52
End: 2017-07-21

## 2017-07-21 DIAGNOSIS — A15.9 TB (TUBERCULOSIS): Primary | ICD-10-CM

## 2017-07-21 NOTE — NURSING NOTE
Mantoux/PPD screening questions    1. Have you had recent contact with a person with active Tuberculosis (TB)? NO  2. Have you had this type of test before? yes  3. Have you had a live vaccine (Smallpox, Flumist, MMR, Varicella, Oral Polio, or Yellow Fever) in the past 4 weeks? NO  4. Have you ever received the Bacillus Calmette-Quiana (BCG) vaccine for Tuberculosis? NO  5. Have you ever been treated for Tuberculosis before? NO    Patient is eligible for a PPD test.  I placed the PPD on the left forearm at 11:26am.  I advised patient to avoid scratching the area and to return to the clinic in 48-72 hours for interpretation. Dr. Atkinson was onsite at the time of placement.    Rosy Elmore MA

## 2017-07-21 NOTE — MR AVS SNAPSHOT
After Visit Summary   7/21/2017    Mary Gill    MRN: [id number]           Patient Information     Date Of Birth          1965        Visit Information        Provider Department      7/21/2017 11:00 AM Nurse, [facility]        Today's Diagnoses     TB (tuberculosis)    -  1       Follow-ups after your visit        Who to contact     Please call your clinic at [phone] to:    Ask questions about your health    Make or cancel appointments    Discuss your medicines    Learn about your test results    Speak to your doctor   If you have compliments or concerns about an experience at your clinic, or if you wish to file a complaint, please contact [facility] Patient Relations at [phone] or email us at [email or web address]         Additional Information About Your Visit        MyChart Information     [employer] gives you secure access to your electronic health record. If you see a primary care provider, you can also send messages to your care team and make appointments. If you have questions, please call your primary care clinic.  If you do not have a primary care provider, please call [phone] and they will assist you.      [employer] is an electronic gateway that provides easy, online access to your medical records. With [employer], you can request a clinic appointment, read your test results, renew a prescription or communicate with your care team.     To access your existing account, please contact your [facility] or call [phone] for assistance.        Care EveryWhere ID     This is your Care EveryWhere ID. This could be used by other organizations to access your [place] medical records  [id number]         Blood Pressure from Last 3 Encounters:   11/15/16 124/77   06/28/16 121/85   03/24/16 128/81    Weight from Last 3 Encounters:   11/15/16 191 lb (86.6 kg)   06/28/16 193 lb 9.6 oz  (87.8 kg)   03/24/16 196 lb (88.9 kg)              We Performed the Following     tuberculin (Mantoux, PPD) 5 UNIT/0.1ML ID injection (Charge)        Primary Care Provider Office Phone # Fax #    Ana Campos [phone]       [facility] [address]  [facility] [address]        Equal Access to Services     MARY DELA CRUZ : Hadii aad ku hadasho Soomaali, waaxda luqadaha, qaybta kaalmada adeegyada, waxay idiin hayaan adeeg khsimonsh la'aan ah. So [facility] [phone].    ATENCIÓN: Si maru rascon, tiene a evans disposición servicios gratuitos de asistencia lingüística. Llame al [phone].    We comply with applicable federal civil rights laws and [place] laws. We do not discriminate on the basis of race, color, national origin, age, disability sex, sexual orientation or gender identity.            Thank you!     Thank you for choosing [facility]  for your care. Our goal is always to provide you with excellent care. Hearing back from our patients is one way we can continue to improve our services. Please take a few minutes to complete the written survey that you may receive in the mail after your visit with us. Thank you!             Your Updated Medication List - Protect others around you: Learn how to safely use, store and throw away your medicines at www.disposemymeds.org.          This list is accurate as of: 7/21/17 11:59 PM.  Always use your most recent med list.                   Brand Name Dispense Instructions for use Diagnosis    albuterol 108 (90 BASE) MCG/ACT Inhaler    PROAIR HFA/PROVENTIL HFA/VENTOLIN HFA    1 Inhaler    Inhale 2 puffs into the lungs 4 times daily    SOB (shortness of breath)       DiphenhydrAMINE HCl 2 % Gel    BENADRYL ITCH STOPPING    118 mL    Apply to the affected area    Bed bug bite       hydrocortisone 1 % cream    CORTAID    30 g    Apply sparingly to affected area three times daily for 14 days.    Bed bug bite        hydrOXYzine 25 MG tablet    ATARAX    30 tablet    Take 1-2 tablets (25-50 mg) by mouth nightly as needed for anxiety (insomnia)    Anxiety       SUMAtriptan 50 MG tablet    IMITREX    18 tablet    Take 1 tablet (50 mg) by mouth at onset of headache for migraine May repeat dose in 2 hours.  Do not exceed 200 mg in 24 hours    Intractable chronic migraine without aura and with status migrainosus       ZYRTEC ALLERGY 10 MG Caps   Generic drug:  cetirizine HCl      Take  by mouth.

## 2017-07-24 ENCOUNTER — ALLIED HEALTH/NURSE VISIT (OUTPATIENT)
Dept: FAMILY MEDICINE | Facility: CLINIC | Age: 52
End: 2017-07-24

## 2017-07-24 DIAGNOSIS — Z11.1 SCREENING EXAMINATION FOR PULMONARY TUBERCULOSIS: Primary | ICD-10-CM

## 2017-07-24 NOTE — NURSING NOTE
[facility]  [address] [address]  Phone: [phone]  Fax: [phone]    7/24/2017  10:10 AM    Mary Gill  1965  [address]  [facility] [address]      Mantoux Results:   MANTOUX RESULTS    No results found for: PPDREDNESS  No results found for: PPDINDURATIO    Results Interpretation:  This test is negative. 0mm      An induration of 10 or more millimeters is considered positive in ANY patient.     An induration of 5 or more millimeters is considered positive in  -HIV-infected persons  -A recent contact of a person with TB disease  -Persons with fibrotic changes on chest radiograph consistent with prior TB  -Patients with organ transplants  -Persons who are immunosuppressed for other reasons (e.g., taking the equivalent of >15 mg/day of prednisone for 1 month or longer, taking TNF-a antagonists)       Rosy Elmore MA

## 2017-07-28 ENCOUNTER — TELEPHONE (OUTPATIENT)
Dept: PSYCHOLOGY | Facility: CLINIC | Age: 52
End: 2017-07-28

## 2017-07-28 NOTE — TELEPHONE ENCOUNTER
This provider placed outreach call to the patient in order to check-in and assess the patient's interest in scheduling an additional appointment with this provider (Most recent visit was 4/26/2017). Talked with the patient. Expressed interest in scheduling an appointment with this provider. Transferred the patient to Scheduling in order to get an appointment scheduled. Review of the patient's medical record immediately following the outreach call indicated the patient is scheduled to meet with this provider Wednesday (8/2/2017).    Nathaniel Lombardi, Ph.D.  Behavioral Health fellow

## 2017-08-02 ENCOUNTER — OFFICE VISIT (OUTPATIENT)
Dept: PSYCHOLOGY | Facility: CLINIC | Age: 52
End: 2017-08-02

## 2017-08-02 DIAGNOSIS — F43.22 ADJUSTMENT DISORDER WITH ANXIETY: ICD-10-CM

## 2017-08-02 DIAGNOSIS — F32.1 MAJOR DEPRESSIVE DISORDER, SINGLE EPISODE, MODERATE (H): Primary | ICD-10-CM

## 2017-08-02 ASSESSMENT — ANXIETY QUESTIONNAIRES
2. NOT BEING ABLE TO STOP OR CONTROL WORRYING: SEVERAL DAYS
1. FEELING NERVOUS, ANXIOUS, OR ON EDGE: NEARLY EVERY DAY
3. WORRYING TOO MUCH ABOUT DIFFERENT THINGS: SEVERAL DAYS
6. BECOMING EASILY ANNOYED OR IRRITABLE: MORE THAN HALF THE DAYS
5. BEING SO RESTLESS THAT IT IS HARD TO SIT STILL: NOT AT ALL
7. FEELING AFRAID AS IF SOMETHING AWFUL MIGHT HAPPEN: NOT AT ALL
IF YOU CHECKED OFF ANY PROBLEMS ON THIS QUESTIONNAIRE, HOW DIFFICULT HAVE THESE PROBLEMS MADE IT FOR YOU TO DO YOUR WORK, TAKE CARE OF THINGS AT HOME, OR GET ALONG WITH OTHER PEOPLE: SOMEWHAT DIFFICULT
GAD7 TOTAL SCORE: 8

## 2017-08-02 ASSESSMENT — PATIENT HEALTH QUESTIONNAIRE - PHQ9: 5. POOR APPETITE OR OVEREATING: SEVERAL DAYS

## 2017-08-02 NOTE — PROGRESS NOTES
Behavioral Health Progress Note    Client Name: Mary Gill    Service Type: Individual  Length of Visit: 50 minutes  Attendees: Patient    Identifying Information and Presenting Problem:  The patient is a 51 year old American female who is being seen for problematic anxious and depressive symptoms.    Treatment Objective(s) Addressed in This Session:  Reviewed treatment plan  Anxiety: will develop healthy cognitive patterns and beliefs    Progress on / Status of Treatment Objective(s) / Homework:  Achieved/Completed  Satisfactory progress     PHQ-9 SCORE 3/1/2017 4/26/2017 8/2/2017   Total Score 18 5 10     ELENA-7 SCORE 3/1/2017 4/26/2017 8/2/2017   Total Score 8 3 8     Topics Discussed/Interventions Provided:    Reviewed symptoms experienced between visits. Most recent visit with this provider was on 4/26/2017. Increased depressive symptoms since previous visit, in addition to increased anxious symptoms during that time frame. Attributed current/recent anxious and depressive symptoms to multiple ongoing significant life stressors, in addition to several more recent life stressors (e.g., Death of a friend/neighbor).       Reviewed the patient's treatment plan. Collaboratively reviewed the patient's treatment plan during the current visit. Agreed that the treatment plan remains current based on the patient's status and progress to date. Expressed interest in continuing to work to develop adaptive behavioral/cognitive coping strategies for her anxious, depressive, and stress-related symptoms, as well as working to identify and challenge thoughts/thought patterns contributing to her symptoms.       Reflected on recent experiences/stressors and discussed concept of self-compassion. 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 Noted \"everything is just filled to the brim\" and expressed frustration with her difficulties managing everything adaptively (e.g., Asking herself why she can't keep up). Introduced the concept of self-compassion and its core components (i.e., Self-kindness vs Self-judgment; Common humanity vs Isolation; Mindfulness vs Over-identification), and related this to the concept of perspective taking in order to increase the salience of the concept. Appeared receptive and to have a good initial understanding. Agreed to consider the concept between current and next visit.       Briefly reviewed concept of cognitive restructuring/thought challenging. Attributed a portion of academic-related stressors to difficulties completing assignments/projects. Reflected on her difficulties. Observed that her difficulties are largely associated with \"needing things to be perfect.\" Considered this observation within the context of cognitive restructuring/thought challenging, and reflected on the impact of this on her experience(s). Appeared receptive to the discussion. Agreed to continue discussion during next visit, and agreed to pay attention to assumptions/thoughts impacting her experiences between visits.     Assessment: The patient appeared to be active and engaged in today's session and was receptive to feedback.     Mental Status: Mary Gill appeared generally alert and oriented. Dress was casual and appropriate to the weather and occasion. Grooming and hygiene were good. Eye contact was good. Speech was of normal volume and rate and was clear, coherent, and relevant. Mood was neutral with occasionally tearful affect. Thought processes were relevant, logical and goal-directed. Thought content was WNL with no evidence of psychotic or paranoid features. No evidence of SI/HI or " self-harm, intent, or plans. Memory appeared grossly intact. Insight and judgment appeared good and patient exhibited good impulse control during the appointment.     Does the patient appear to be at imminent risk of harm to self/others at this time? No    The session was necessary to address anxious and depressive symptoms that have been interfering with patient's ability to function in academic settings. Ongoing psychotherapy is necessary to stabilize mood, provide counseling, provide psychoeducation and provide support.    Diagnosis (DSM-5):  296.22 Major Depressive Disorder, Single Episode, Moderate  309.24 Adjustment Disorder With Anxiety     Plan:  1. Follow-up in one or two weeks.  2. Work on goals noted in the patient instructions.   3. Utilize crisis resources as needed.      Nathaniel Lombardi, Ph.D.  Behavioral Health Fellow      NOTE: The treatment plan originally dated 3/8/2017 (Most recently reviewed 6/8/2017) was reviewed with the patient at today's visit. The treatment plan remains current based on the patient's status and progress to date. Next treatment plan update due 11/2/2017. Diagnostic assessment update due 11/18/2017.

## 2017-08-02 NOTE — MR AVS SNAPSHOT
After Visit Summary   8/2/2017    Mary Gill    MRN: [id number]           Patient Information     Date Of Birth          1965        Visit Information        Provider Department      8/2/2017 9:40 AM Lombardi, Nathaniel, PhD [facility]        Care Instructions    -Schedule follow-up with Dashawn before leaving the clinic.     -Pay attention to the assumptions/thoughts impacting my experiences.    -Be sure to engage in self-care between visits, and consider a self-compassionate approach.              Follow-ups after your visit        Who to contact     Please call your clinic at [phone] to:    Ask questions about your health    Make or cancel appointments    Discuss your medicines    Learn about your test results    Speak to your doctor   If you have compliments or concerns about an experience at your clinic, or if you wish to file a complaint, please contact [facility] Patient Relations at [phone] or email us at [email or web address]         Additional Information About Your Visit        MyChart Information     [employer] gives you secure access to your electronic health record. If you see a primary care provider, you can also send messages to your care team and make appointments. If you have questions, please call your primary care clinic.  If you do not have a primary care provider, please call [phone] and they will assist you.      [employer] is an electronic gateway that provides easy, online access to your medical records. With [employer], you can request a clinic appointment, read your test results, renew a prescription or communicate with your care team.     To access your existing account, please contact your [facility] or call [phone] for assistance.        Care EveryWhere ID     This is your Care EveryWhere ID. This could be used by other organizations to access your [facility]  records  [id number]         Blood Pressure from Last 3 Encounters:   11/15/16 124/77   06/28/16 121/85   03/24/16 128/81    Weight from Last 3 Encounters:   11/15/16 86.6 kg (191 lb)   06/28/16 87.8 kg (193 lb 9.6 oz)   03/24/16 88.9 kg (196 lb)              Today, you had the following     No orders found for display       Primary Care Provider Office Phone # Fax #    Ana Campos [phone]       [facility] [address] [facility] [address]        Equal Access to Services     SUSU DELA CRUZ : Hadii willard Lopes, darcy arana, mesfin razomavincent kahn, arley duron . So [facility] [phone].    ATENCIÓN: Si habla español, tiene a evans disposición servicios gratuitos de asistencia lingüística. [facility] [phone].    We comply with applicable federal civil rights laws and [place] laws. We do not discriminate on the basis of race, color, national origin, age, disability sex, sexual orientation or gender identity.            Thank you!     Thank you for choosing [facility]  for your care. Our goal is always to provide you with excellent care. Hearing back from our patients is one way we can continue to improve our services. Please take a few minutes to complete the written survey that you may receive in the mail after your visit with us. Thank you!             Your Updated Medication List - Protect others around you: Learn how to safely use, store and throw away your medicines at www.disposemymeds.org.          This list is accurate as of: 8/2/17 10:51 AM.  Always use your most recent med list.                   Brand Name Dispense Instructions for use Diagnosis    albuterol 108 (90 BASE) MCG/ACT Inhaler    PROAIR HFA/PROVENTIL HFA/VENTOLIN HFA    1 Inhaler    Inhale 2 puffs into the lungs 4 times daily    SOB (shortness of breath)       DiphenhydrAMINE HCl 2 % Gel    BENADRYL ITCH STOPPING    118 mL    Apply to the  affected area    Bed bug bite       hydrocortisone 1 % cream    CORTAID    30 g    Apply sparingly to affected area three times daily for 14 days.    Bed bug bite       hydrOXYzine 25 MG tablet    ATARAX    30 tablet    Take 1-2 tablets (25-50 mg) by mouth nightly as needed for anxiety (insomnia)    Anxiety       SUMAtriptan 50 MG tablet    IMITREX    18 tablet    Take 1 tablet (50 mg) by mouth at onset of headache for migraine May repeat dose in 2 hours.  Do not exceed 200 mg in 24 hours    Intractable chronic migraine without aura and with status migrainosus       ZYRTEC ALLERGY 10 MG Caps   Generic drug:  cetirizine HCl      Take  by mouth.

## 2017-08-02 NOTE — PATIENT INSTRUCTIONS
-Schedule follow-up with Dashawn before leaving the clinic.     -Pay attention to the assumptions/thoughts impacting my experiences.    -Be sure to engage in self-care between visits, and consider a self-compassionate approach.

## 2017-08-03 ASSESSMENT — ANXIETY QUESTIONNAIRES: GAD7 TOTAL SCORE: 8

## 2017-08-03 ASSESSMENT — PATIENT HEALTH QUESTIONNAIRE - PHQ9: SUM OF ALL RESPONSES TO PHQ QUESTIONS 1-9: 10

## 2017-08-07 DIAGNOSIS — G43.711 INTRACTABLE CHRONIC MIGRAINE WITHOUT AURA AND WITH STATUS MIGRAINOSUS: ICD-10-CM

## 2017-08-07 NOTE — TELEPHONE ENCOUNTER
Date of last visit at clinic: 11/15/16    Please complete refill and CLOSE ENCOUNTER.  Closing the encounter signifies the refill is complete.    Amber Pace RN

## 2017-08-07 NOTE — TELEPHONE ENCOUNTER
[facility] phone call message- patient requesting a refill:    Full Medication Name: Imitrex    Dose: Take 1 tablet (50 mg) by mouth at onset of headache for migraine May repeat dose in 2 hours. Do not exceed 200 mg in 24 hours    Pharmacy confirmed as   [facility] - [place], [place] - [address] AT [facility] [address]  [address]  [facility] [address]  Phone: [phone] Fax: [phone]  : Yes    Additional Comments: Please refill today if possible.  She is leaving for town tomorrow around 1pm.  She is out of medication    OK to leave a message on voice mail? Yes    Primary language: English      needed? No    Call taken on August 7, 2017 at 2:35 PM by Dai Mosher

## 2017-08-08 RX ORDER — SUMATRIPTAN 50 MG/1
50 TABLET, FILM COATED ORAL
Qty: 18 TABLET | Refills: 6 | Status: SHIPPED | OUTPATIENT
Start: 2017-08-08 | End: 2017-09-05

## 2017-09-05 ENCOUNTER — OFFICE VISIT (OUTPATIENT)
Dept: FAMILY MEDICINE | Facility: CLINIC | Age: 52
End: 2017-09-05

## 2017-09-05 VITALS
TEMPERATURE: 98.3 F | HEART RATE: 80 BPM | SYSTOLIC BLOOD PRESSURE: 111 MMHG | DIASTOLIC BLOOD PRESSURE: 78 MMHG | BODY MASS INDEX: 33.9 KG/M2 | WEIGHT: 191.4 LBS | RESPIRATION RATE: 16 BRPM

## 2017-09-05 DIAGNOSIS — Z00.00 ROUTINE GENERAL MEDICAL EXAMINATION AT A HEALTH CARE FACILITY: ICD-10-CM

## 2017-09-05 DIAGNOSIS — G43.711 INTRACTABLE CHRONIC MIGRAINE WITHOUT AURA AND WITH STATUS MIGRAINOSUS: ICD-10-CM

## 2017-09-05 DIAGNOSIS — N64.4 MASTODYNIA: Primary | ICD-10-CM

## 2017-09-05 RX ORDER — SUMATRIPTAN 50 MG/1
50 TABLET, FILM COATED ORAL
Qty: 18 TABLET | Refills: 11 | Status: SHIPPED | OUTPATIENT
Start: 2017-09-05

## 2017-09-05 NOTE — MR AVS SNAPSHOT
After Visit Summary   9/5/2017    Mary Gill    MRN: [id number]           Patient Information     Date Of Birth          1965        Visit Information        Provider Department      9/5/2017 11:20 AM Kandis Norton MD [facility]        Today's Diagnoses     Mastodynia    -  1       Follow-ups after your visit        Future tests that were ordered for you today     Open Future Orders        Priority Expected Expires Ordered    Diagnostic Mammogram Digital Bilateral Routine  9/5/2018 9/5/2017            Who to contact     Please call your clinic at [phone] to:    Ask questions about your health    Make or cancel appointments    Discuss your medicines    Learn about your test results    Speak to your doctor   If you have compliments or concerns about an experience at your clinic, or if you wish to file a complaint, please contact [facility] Patient Relations at [phone] or email us at [email or web address]         Additional Information About Your Visit        MyChart Information     [employer] gives you secure access to your electronic health record. If you see a primary care provider, you can also send messages to your care team and make appointments. If you have questions, please call your primary care clinic.  If you do not have a primary care provider, please call [phone] and they will assist you.      [employer] is an electronic gateway that provides easy, online access to your medical records. With [employer], you can request a clinic appointment, read your test results, renew a prescription or communicate with your care team.     To access your existing account, please contact your [facility] or call [phone] for assistance.        Care EveryWhere ID     This is your Care EveryWhere ID. This could be used by other organizations to access your [facility] medical records  [id number]         Your Vitals Were     Pulse Temperature Respirations BMI (Body Mass Index)          80 98.3  F (36.8  C) (Oral) 16 33.9 kg/m2         Blood Pressure from Last 3 Encounters:   09/05/17 111/78   11/15/16 124/77   06/28/16 121/85    Weight from Last 3 Encounters:   09/05/17 191 lb 6.4 oz (86.8 kg)   11/15/16 191 lb (86.6 kg)   06/28/16 193 lb 9.6 oz (87.8 kg)               Primary Care Provider Office Phone # Fax #    Ana Campos [phone]       [facility] [address] [facility] [address]        Equal Access to Services     MARY DELA CRUZ : Hadii willard williamsono Sogray, waaxda luqadaha, qaybta kaalmada adeegyada, arley monsalve. So [facility] [phone].    ATENCIÓN: Si habla español, tiene a evans disposición servicios gratuitos de asistencia lingüística. Llame al [phone].    We comply with applicable federal civil rights laws and [place] laws. We do not discriminate on the basis of race, color, national origin, age, disability sex, sexual orientation or gender identity.            Thank you!     Thank you for choosing [facility]  for your care. Our goal is always to provide you with excellent care. Hearing back from our patients is one way we can continue to improve our services. Please take a few minutes to complete the written survey that you may receive in the mail after your visit with us. Thank you!             Your Updated Medication List - Protect others around you: Learn how to safely use, store and throw away your medicines at www.disposemymeds.org.          This list is accurate as of: 9/5/17 12:00 PM.  Always use your most recent med list.                   Brand Name Dispense Instructions for use Diagnosis    albuterol 108 (90 BASE) MCG/ACT Inhaler    PROAIR HFA/PROVENTIL HFA/VENTOLIN HFA    1 Inhaler    Inhale 2 puffs into the lungs 4 times daily    SOB (shortness of breath)       DiphenhydrAMINE HCl 2 % Gel     BENADRYL ITCH STOPPING    118 mL    Apply to the affected area    Bed bug bite       hydrocortisone 1 % cream    CORTAID    30 g    Apply sparingly to affected area three times daily for 14 days.    Bed bug bite       hydrOXYzine 25 MG tablet    ATARAX    30 tablet    Take 1-2 tablets (25-50 mg) by mouth nightly as needed for anxiety (insomnia)    Anxiety       SUMAtriptan 50 MG tablet    IMITREX    18 tablet    Take 1 tablet (50 mg) by mouth at onset of headache for migraine May repeat dose in 2 hours.  Do not exceed 200 mg in 24 hours    Intractable chronic migraine without aura and with status migrainosus       ZYRTEC ALLERGY 10 MG Caps   Generic drug:  cetirizine HCl      Take  by mouth.

## 2017-09-05 NOTE — PROGRESS NOTES
SUBJECTIVE:  The patient is here primarily to get a diagnostic mammogram order, a routine screening one.  She has had a long history of mastalgia pretty much mostly on the left and she was called to set up a mammogram.  She gets them at [facility] which is at [facility].   Her last one she thinks was about 3 years ago.  They have always been normal but they asked her when she called to set up the appointment, whether she had been having any trouble.  She mentioned the pain so they told her that she should come and get an order for a diagnostic mammogram.  Again the pain usually is related to her periods.  Her periods are irregular now, about every 4 months.  She remembers last December having a really severe time with some left breast pain but again it was a busy time so she really did not think much about it otherwise.  She occasionally takes some pain medication for it but mostly ignores it.  She does have a grandmother with breast cancer.  She never felt an actual mass at all.        OBJECTIVE:  On exam the patient is in no acute distress.  Vital signs are stable.  Examining both breasts I did not find any mass.  She does not have any tenderness right now.  No axillary or supraclavicular adenopathy at all.      IMPRESSION:  Mastalgia usually related to periods, probably related to fibrocystic condition.      PLAN:  I went ahead and wrote for a diagnostic mammogram for her.  I printed that out so she can go ahead and set up that appointment herself.  She also mentioned that she would be interested in a colonoscopy for screening so I went ahead and put that order in also.      Visit length 15 minutes, more than 50% spent in counseling about symptoms and plan.

## 2017-09-28 ENCOUNTER — TELEPHONE (OUTPATIENT)
Dept: GASTROENTEROLOGY | Facility: CLINIC | Age: 52
End: 2017-09-28

## 2017-09-28 NOTE — TELEPHONE ENCOUNTER
Message left for patient to call back to schedule screening colonoscopy. See order.    Rosalinda Khan RN

## 2017-10-03 ENCOUNTER — TELEPHONE (OUTPATIENT)
Dept: FAMILY MEDICINE | Facility: CLINIC | Age: 52
End: 2017-10-03

## 2017-10-03 NOTE — TELEPHONE ENCOUNTER
Placed an outreach call to the patient to check-in and assess her interest in scheduling a follow-up visit with this provider. Talked with the patient. Expressed interest in scheduling an additional appointment. Transferred the patient to the clinic's call center to facilitate the scheduling of an appointment.     Nathaniel Lombardi, PhD   Behavioral Health Fellow

## 2017-10-10 ENCOUNTER — TELEPHONE (OUTPATIENT)
Dept: GASTROENTEROLOGY | Facility: CLINIC | Age: 52
End: 2017-10-10

## 2017-10-10 ENCOUNTER — OFFICE VISIT (OUTPATIENT)
Dept: PSYCHOLOGY | Facility: CLINIC | Age: 52
End: 2017-10-10

## 2017-10-10 DIAGNOSIS — F32.4 MAJOR DEPRESSIVE DISORDER, SINGLE EPISODE, IN PARTIAL REMISSION (H): Primary | ICD-10-CM

## 2017-10-10 DIAGNOSIS — F43.22 ADJUSTMENT DISORDER WITH ANXIETY: ICD-10-CM

## 2017-10-10 NOTE — PATIENT INSTRUCTIONS
-Schedule follow-up with Dashawn before leaving the clinic.     -Continue attending Yoga, and setting the intention of being in the present moment.    -Try out the mindful bedtime routine, and see if it's something that feels helpful.    -Work to identify and reframe unhelpful thoughts to a more compassionate perspective.

## 2017-10-11 ENCOUNTER — TELEPHONE (OUTPATIENT)
Dept: GASTROENTEROLOGY | Facility: CLINIC | Age: 52
End: 2017-10-11

## 2017-10-11 NOTE — TELEPHONE ENCOUNTER
Message left for patient to call back to schedule colonoscopy for screening. See order    Rosalinda Khan, RN

## 2017-10-12 ENCOUNTER — DOCUMENTATION ONLY (OUTPATIENT)
Dept: FAMILY MEDICINE | Facility: CLINIC | Age: 52
End: 2017-10-12

## 2017-10-12 NOTE — PROGRESS NOTES
"When opening a documentation only encounter, be sure to enter in \"Chief Complaint\" Forms and in \" Comments\" Title of form, description if needed.    Mary is a 51 year old  female  Form received via: Fax  Form now resides in: Provider Ready    Mary Ellen Piña CMA      Form has been completed by provider.     Form sent out via: Fax to [facility] at Fax Number: [phone]  Patient informed: n/a  Output date: October 12, 2017    Mary Ellen Piña CMA      **Please close the encounter**                "

## 2017-10-16 NOTE — PROGRESS NOTES
"Behavioral Health Progress Note    Client Legal Name: Mary Gill   Client Preferred Name: Mary    Service Type: Individual  Length of Visit: 30 minutes  Attendees: Patient    Identifying Information and Presenting Problem:  The patient is a 51 year old American female who is being seen for problematic anxious and depressive symptoms.    Treatment Objective(s) Addressed in This Session:  Anxiety: will develop healthy cognitive patterns and beliefs  Adjustment Difficulties: will develop coping/problem-solving skills to facilitate more adaptive adjustment    Progress on / Status of Treatment Objective(s) / Homework:  Satisfactory progress  Satisfactory progress     PHQ-9 SCORE 3/1/2017 4/26/2017 8/2/2017   Total Score 18 5 10     ELENA-7 SCORE 3/1/2017 4/26/2017 8/2/2017   Total Score 8 3 8     Topics Discussed/Interventions Provided:    Reviewed symptoms between visits. Most recent visit with this provider was two months ago on 8/2/2017. Continued anxiety and stress symptoms since previous visit, though she reports her \"mood has been vastly improved recently.\" She attributed her symptoms largely to ongoing life stressors and expressed significant frustration with \"feeling like all the little things won't stop coming.\"       Discussed efforts utilizing cognitive restructuring/thought challenging. Has been making an effort to cope with anxiety and stress symptoms through the use of cognitive restructuring/thought challenging, with some success. Reflected on recent efforts to challenge thoughts/worries that she shouldn't be taking any time for self-care, and considered the impact of her efforts. Found her efforts beneficial, though observed that trying to make changes to take time for self-care is hard.  Plans to continue working to utilizing cognitive restructuring/thought challenging moving forward.       Briefly reflected on efforts to increase self-care.  Recently began attending a weekly yoga class, which she " "reported finding restorative/relaxing, both physically and mentally. Attributed the latter to being focused/oriented on the present moment. Reviewed concept of mindfulness within context of current discussion. Expressed continued receptivity to the concept. Interested in continuing to \"focus on the here and now instead of focusing on the next thing.\" Briefly discussed additional potential opportunities for incorporating mindfulness into the patient's routine, such as a mindful bedtime routine.    Assessment: The patient appeared to be active and engaged in today's session and was receptive to feedback.     Mental Status: Mary Gill appeared generally alert and oriented. Dress was casual and appropriate to the weather and occasion. Grooming and hygiene were good. Eye contact was good. Speech was of normal volume and rate and was clear, coherent, and relevant. Mood was neutral with occasionally tearful affect. Thought processes were relevant, logical and goal-directed. Thought content was WNL with no evidence of psychotic or paranoid features. No evidence of SI/HI or self-harm, intent, or plans. Memory appeared grossly intact. Insight and judgment appeared good and patient exhibited good impulse control during the appointment.     Does the patient appear to be at imminent risk of harm to self/others at this time? No    The session was necessary to address anxious and depressive symptoms that have been interfering with patient's ability to function in academic settings. Ongoing psychotherapy is necessary to stabilize mood, provide counseling, provide psychoeducation and provide support.    Diagnosis (DSM-5):  296.25 Major Depressive Disorder, Single Episode, In Partial Remission  309.24 Adjustment Disorder With Anxiety     Plan:  1. Follow-up appointment scheduled for 11/13/2017.  2. Work on goals noted in the patient instructions.   3. Utilize crisis resources as needed.  4. PHQ-9 and ELENA-7 to be completed during " next visit.  5. Diagnostic assessment update and treatment plan update to be completed during next visit.       Nathaniel Lombardi, Ph.D.  Behavioral Health Fellow      NOTE: Treatment plan update due 11/2/2017. Diagnostic assessment update due 11/18/2017.

## 2017-10-17 ENCOUNTER — TELEPHONE (OUTPATIENT)
Dept: GASTROENTEROLOGY | Facility: CLINIC | Age: 52
End: 2017-10-17

## 2017-10-17 DIAGNOSIS — Z12.11 ENCOUNTER FOR SCREENING COLONOSCOPY: Primary | ICD-10-CM

## 2017-10-17 NOTE — TELEPHONE ENCOUNTER
Patient scheduled for Colonoscopy    Indication for procedure. Screening    Referring Provider. Kandis Norton MD    ? Not Needed    Arrival time verified? Yes, 1240    Facility location verified? Yes, [address]    Instructions given regarding prep and procedure    Prep Type Golytely    Are you taking any anticoagulants or blood thinners? No    Instructions given? N/A    Electronic implanted devices? No    Pre procedure teaching completed? Yes    Transportation from procedure?  or Sister    H&P / Pre op physical completed? N/A

## 2017-10-20 ENCOUNTER — HOSPITAL ENCOUNTER (OUTPATIENT)
Facility: CLINIC | Age: 52
Discharge: HOME | End: 2017-10-20
Attending: INTERNAL MEDICINE | Admitting: INTERNAL MEDICINE
Payer: COMMERCIAL

## 2017-10-20 ENCOUNTER — SURGERY (OUTPATIENT)
Age: 52
End: 2017-10-20

## 2017-10-20 VITALS
BODY MASS INDEX: 32.96 KG/M2 | WEIGHT: 186 LBS | SYSTOLIC BLOOD PRESSURE: 108 MMHG | HEART RATE: 74 BPM | OXYGEN SATURATION: 98 % | HEIGHT: 63 IN | RESPIRATION RATE: 11 BRPM | DIASTOLIC BLOOD PRESSURE: 78 MMHG

## 2017-10-20 DIAGNOSIS — N64.4 MASTODYNIA: Primary | ICD-10-CM

## 2017-10-20 LAB — COLONOSCOPY: NORMAL

## 2017-10-20 PROCEDURE — 99153 MOD SED SAME PHYS/QHP EA: CPT | Performed by: INTERNAL MEDICINE

## 2017-10-20 PROCEDURE — 88305 TISSUE EXAM BY PATHOLOGIST: CPT | Performed by: INTERNAL MEDICINE

## 2017-10-20 PROCEDURE — 45380 COLONOSCOPY AND BIOPSY: CPT | Mod: PT | Performed by: INTERNAL MEDICINE

## 2017-10-20 PROCEDURE — 25000132 ZZH RX MED GY IP 250 OP 250 PS 637: Performed by: INTERNAL MEDICINE

## 2017-10-20 PROCEDURE — G0500 MOD SEDAT ENDO SERVICE >5YRS: HCPCS | Performed by: INTERNAL MEDICINE

## 2017-10-20 PROCEDURE — 25000128 H RX IP 250 OP 636: Performed by: INTERNAL MEDICINE

## 2017-10-20 RX ORDER — FENTANYL CITRATE 50 UG/ML
INJECTION, SOLUTION INTRAMUSCULAR; INTRAVENOUS PRN
Status: DISCONTINUED | OUTPATIENT
Start: 2017-10-20 | End: 2017-10-20 | Stop reason: HOSPADM

## 2017-10-20 RX ORDER — SIMETHICONE
LIQUID (ML) MISCELLANEOUS PRN
Status: DISCONTINUED | OUTPATIENT
Start: 2017-10-20 | End: 2017-10-20 | Stop reason: HOSPADM

## 2017-10-20 RX ADMIN — Medication 2 ML: at 13:12

## 2017-10-20 RX ADMIN — MIDAZOLAM HYDROCHLORIDE 2 MG: 1 INJECTION, SOLUTION INTRAMUSCULAR; INTRAVENOUS at 13:11

## 2017-10-20 RX ADMIN — MIDAZOLAM HYDROCHLORIDE 1 MG: 1 INJECTION, SOLUTION INTRAMUSCULAR; INTRAVENOUS at 13:15

## 2017-10-20 RX ADMIN — FENTANYL CITRATE 50 MCG: 50 INJECTION, SOLUTION INTRAMUSCULAR; INTRAVENOUS at 13:11

## 2017-10-20 RX ADMIN — FENTANYL CITRATE 50 MCG: 50 INJECTION, SOLUTION INTRAMUSCULAR; INTRAVENOUS at 13:15

## 2017-10-20 NOTE — IP AVS SNAPSHOT
[facility], [place], Endoscopy    [address], [place] [address]    Phone:  [phone]                                       After Visit Summary   10/20/2017    Mary Gill    MRN: [id number]           After Visit Summary Signature Page     I have received my discharge instructions, and my questions have been answered. I have discussed any challenges I see with this plan with the nurse or doctor.    ..........................................................................................................................................  Patient/Patient Representative Signature      ..........................................................................................................................................  Patient Representative Print Name and Relationship to Patient    ..................................................               ................................................  Date                                            Time    ..........................................................................................................................................  Reviewed by Signature/Title    ...................................................              ..............................................  Date                                                            Time

## 2017-10-20 NOTE — IP AVS SNAPSHOT
MRN:[id number]                      After Visit Summary   10/20/2017    Mary Gill    MRN: [id number]           Thank you!     Thank you for choosing [facility] for your care. Our goal is always to provide you with excellent care. Hearing back from our patients is one way we can continue to improve our services. Please take a few minutes to complete the written survey that you may receive in the mail after you visit with us. Thank you!        Patient Information     Date Of Birth          1965        About your hospital stay     You were admitted on:  October 20, 2017 You last received care in the:  [facility], Endoscopy    You were discharged on:  October 20, 2017       Who to Call     For medical emergencies, please call 911.  For non-urgent questions about your medical care, please call your primary care provider or clinic, [phone]  For questions related to your surgery, please call your surgery clinic        Attending Provider     Provider Kaiser Castañeda MD Gastroenterology       Primary Care Provider Office Phone # Fax #    Ana Campos [phone]      Your next 10 appointments already scheduled     Nov 13, 2017 10:00 AM CST   Return Visit with Nathaniel Lombardi, PhD   [facility] ([facility])    [address],  [address]  [place] [address]   [phone]              Further instructions from your care team       Discharge Instructions after Colonoscopy  or Sigmoidoscopy    Today you had a ___x_ Colonoscopy ____ Sigmoidoscopy    Activity and Diet  You were given medicine for pain. You may be dizzy or sleepy.  For 24 hours:    Do not drive or use heavy equipment.    Do not make important decisions.    Do not drink any alcohol.  You may return to your normal diet and medicines.    Discomfort    Air was placed in your colon during the exam in order to see it. Walking helps to pass the air.    You may take  "Tylenol (acetaminophen) for pain unless your doctor has told you not to.  Do not take aspirin or ibuprofen (Advil, Motrin, or other anti-inflammatory  drugs) for _3____ days.    Follow-up  _x___ We took small tissue samples or polyps to study. Your doctor will call you with the results  within two weeks.    When to call:    Call right away if you have:    Unusual pain in belly or chest pain not relieved with passing air.    More than 1 to 2 Tablespoons of bleeding from your rectum.    Fever above 100.6  F (37.5  C).    If you have severe pain, bleeding, or shortness of breath, go to an emergency room.    If you have questions, call:  Monday to Friday, 7 a.m. to 4:30 p.m.  Endoscopy: [phone] (We may have to call you back)    After hours  Hospital: [phone] (Ask for the GI fellow on call)    Pending Results     No orders found from 10/18/2017 to 10/21/2017.            Admission Information     Date & Time Provider Department Dept. Phone    10/20/2017 Kaiser Donohue MD [facility], [place], Endoscopy [phone]      Your Vitals Were     Blood Pressure Pulse Respirations Height Weight Pulse Oximetry    108/78 74 11 1.6 m (5' 3\") 84.4 kg (186 lb) 98%    BMI (Body Mass Index)                   32.95 kg/m2           MyChart Information     [employer] gives you secure access to your electronic health record. If you see a primary care provider, you can also send messages to your care team and make appointments. If you have questions, please call your primary care clinic.  If you do not have a primary care provider, please call [phone] and they will assist you.        Care EveryWhere ID     This is your Care EveryWhere ID. This could be used by other organizations to access your [place] medical records  [id number]        Equal Access to Services     MARY DELA CRUZ : Lucy Lopes, darcy arana, mesfin kahn, arley monsalve. So [facility] [phone].    ATENCIÓN: " Si habla abiodun, tiene a evans disposición servicios gratuitos de asistencia lingüística. Gutierrez carrasquillo [phone].    We comply with applicable federal civil rights laws and [place] laws. We do not discriminate on the basis of race, color, national origin, age, disability, sex, sexual orientation, or gender identity.               Review of your medicines      UNREVIEWED medicines. Ask your doctor about these medicines        Dose / Directions    albuterol 108 (90 BASE) MCG/ACT Inhaler   Commonly known as:  PROAIR HFA/PROVENTIL HFA/VENTOLIN HFA   Used for:  SOB (shortness of breath)        Dose:  2 puff   Inhale 2 puffs into the lungs 4 times daily   Quantity:  1 Inhaler   Refills:  0       DiphenhydrAMINE HCl 2 % Gel   Commonly known as:  BENADRYL ITCH STOPPING   Used for:  Bed bug bite        Apply to the affected area   Quantity:  118 mL   Refills:  0       hydrocortisone 1 % cream   Commonly known as:  CORTAID   Used for:  Bed bug bite        Apply sparingly to affected area three times daily for 14 days.   Quantity:  30 g   Refills:  0       hydrOXYzine 25 MG tablet   Commonly known as:  ATARAX   Used for:  Anxiety        Dose:  25-50 mg   Take 1-2 tablets (25-50 mg) by mouth nightly as needed for anxiety (insomnia)   Quantity:  30 tablet   Refills:  1       SUMAtriptan 50 MG tablet   Commonly known as:  IMITREX   Used for:  Intractable chronic migraine without aura and with status migrainosus        Dose:  50 mg   Take 1 tablet (50 mg) by mouth at onset of headache for migraine May repeat dose in 2 hours.  Do not exceed 200 mg in 24 hours   Quantity:  18 tablet   Refills:  11       ZYRTEC ALLERGY 10 MG Caps   Generic drug:  cetirizine HCl        Take  by mouth.   Refills:  0                Protect others around you: Learn how to safely use, store and throw away your medicines at www.disposemymeds.org.             Medication List: This is a list of all your medications and when to take them. Check marks below  indicate your daily home schedule. Keep this list as a reference.      Medications           Morning Afternoon Evening Bedtime As Needed    albuterol 108 (90 BASE) MCG/ACT Inhaler   Commonly known as:  PROAIR HFA/PROVENTIL HFA/VENTOLIN HFA   Inhale 2 puffs into the lungs 4 times daily                                DiphenhydrAMINE HCl 2 % Gel   Commonly known as:  BENADRYL ITCH STOPPING   Apply to the affected area                                hydrocortisone 1 % cream   Commonly known as:  CORTAID   Apply sparingly to affected area three times daily for 14 days.                                hydrOXYzine 25 MG tablet   Commonly known as:  ATARAX   Take 1-2 tablets (25-50 mg) by mouth nightly as needed for anxiety (insomnia)                                SUMAtriptan 50 MG tablet   Commonly known as:  IMITREX   Take 1 tablet (50 mg) by mouth at onset of headache for migraine May repeat dose in 2 hours.  Do not exceed 200 mg in 24 hours                                ZYRTEC ALLERGY 10 MG Caps   Take  by mouth.   Generic drug:  cetirizine HCl

## 2017-10-20 NOTE — DISCHARGE INSTRUCTIONS
Discharge Instructions after Colonoscopy  or Sigmoidoscopy    Today you had a ___x_ Colonoscopy ____ Sigmoidoscopy    Activity and Diet  You were given medicine for pain. You may be dizzy or sleepy.  For 24 hours:    Do not drive or use heavy equipment.    Do not make important decisions.    Do not drink any alcohol.  You may return to your normal diet and medicines.    Discomfort    Air was placed in your colon during the exam in order to see it. Walking helps to pass the air.    You may take Tylenol (acetaminophen) for pain unless your doctor has told you not to.  Do not take aspirin or ibuprofen (Advil, Motrin, or other anti-inflammatory  drugs) for _3____ days.    Follow-up  _x___ We took small tissue samples or polyps to study. Your doctor will call you with the results  within two weeks.    When to call:    Call right away if you have:    Unusual pain in belly or chest pain not relieved with passing air.    More than 1 to 2 Tablespoons of bleeding from your rectum.    Fever above 100.6  F (37.5  C).    If you have severe pain, bleeding, or shortness of breath, go to an emergency room.    If you have questions, call:  Monday to Friday, 7 a.m. to 4:30 p.m.  Endoscopy: [phone] (We may have to call you back)    After hours  Hospital: [phone] (Ask for the GI fellow on call)

## 2017-10-20 NOTE — OR NURSING
Colonoscopy with polypectomies x2 via cold biopsy forceps completed. Pt tolerated procedure well.  VSS on 2L NC.  Sedation given per MD instruction.  Pt taken to recovery by RN.

## 2017-10-20 NOTE — PROGRESS NOTES
Breast ultrasound ordered. Will fax the order to [phone].    Ana Campos MD MPH  PGY3- [facility], Family Medicine  Pager- [phone]

## 2017-10-23 LAB — COPATH REPORT: NORMAL

## 2017-10-25 NOTE — PROGRESS NOTES
Called patient to review pathology results from polyps removed during colonoscopy. Two adenomas removed. Repeat surveillance colonoscopy recommended in 5 years.

## 2017-11-20 ENCOUNTER — OFFICE VISIT (OUTPATIENT)
Dept: PSYCHOLOGY | Facility: CLINIC | Age: 52
End: 2017-11-20

## 2017-11-20 DIAGNOSIS — F32.4 MAJOR DEPRESSIVE DISORDER, SINGLE EPISODE, IN PARTIAL REMISSION (H): Primary | ICD-10-CM

## 2017-11-20 DIAGNOSIS — F43.22 ADJUSTMENT DISORDER WITH ANXIETY: ICD-10-CM

## 2017-11-20 ASSESSMENT — ANXIETY QUESTIONNAIRES
5. BEING SO RESTLESS THAT IT IS HARD TO SIT STILL: NOT AT ALL
6. BECOMING EASILY ANNOYED OR IRRITABLE: MORE THAN HALF THE DAYS
3. WORRYING TOO MUCH ABOUT DIFFERENT THINGS: MORE THAN HALF THE DAYS
GAD7 TOTAL SCORE: 7
2. NOT BEING ABLE TO STOP OR CONTROL WORRYING: NOT AT ALL
1. FEELING NERVOUS, ANXIOUS, OR ON EDGE: SEVERAL DAYS
7. FEELING AFRAID AS IF SOMETHING AWFUL MIGHT HAPPEN: SEVERAL DAYS

## 2017-11-20 ASSESSMENT — PATIENT HEALTH QUESTIONNAIRE - PHQ9
5. POOR APPETITE OR OVEREATING: SEVERAL DAYS
SUM OF ALL RESPONSES TO PHQ QUESTIONS 1-9: 7

## 2017-11-20 NOTE — PATIENT INSTRUCTIONS
Treatment Plan    Client's Name: Mary Gill  YOB: 1965  Today's Date: 11/20/2017   Date Diagnostic Update Due: 11/20/2018    DSM-V Diagnoses:   296.25 Major Depressive Disorder, Single Episode, In Partial Remission  309.24 Adjustment Disorder With Anxiety     Psychosocial / Contextual Factors:   Patient is continuing to experience a number of significant life stressors.    WHODAS 2.0 TOTAL SCORES 11/18/2016   Total Score 16     PC-PTSD: 0 /4  CAGE AID: 0 /4    PHQ-9 SCORE 4/26/2017 8/2/2017 11/20/2017   Total Score 5 10 7     ELENA-7 SCORE 4/26/2017 8/2/2017 11/20/2017   Total Score 3 8 7     Anticipated treatment duration: Unknown  Agreed upon meeting frequency: Monthly    Long Term Treatment Goal(s) related to diagnosis / functional impairment(s)  Goal 1: Patient will work with this provider to develop adaptive behavioral and cognitive coping strategies for anxious/depressive/stress-related symptoms.    I will know I've met my goal when I start singing spontaneously more often.      Steps we will take to achieve your goal:    Patient will  identify and utilize behavioral and cognitive strategies to more effectively address anxious/depressive/stress-related symptoms in-the-moment.  Status: Continued: 3/8/2017     Intervention(s)  Therapist will provide support, psychoeducation and homework assignments as needed.    Goal 2: Patient will work with this provider to identify and challenge thoughts and thought patterns contributing to anxious and depressive symptoms.    I will know I've met my goal when I'm able to get back to sleep at night in a reasonable amount of time.      Steps we will take to achieve your goal:    Patient will Identify negative self-talk and behaviors: challenge core beliefs, myths, and actions.  Status: Continued: 3/8/2017      Intervention(s)  Therapist will provide support, psychoeducation and homework assignments as needed.    If you need additional support and care during  times that your therapist or PCP are not available, here are some additional resources for you:    Help in a Crisis:    [facility] ([facility] Mobile Response Team)  [phone]   Crisis Connection:  167.616.4064  [facility] Multilingual Crisis Line:  [phone]    [place] [facility] [place] crisis walk-in and crisis line  [address] [place], [place]  [phone]    [facility]   [address] [place] [address]   [phone] (for 24 hour crisis consultation)    Monday - Friday 8:00am - 7:00pm  Saturday:  11:00am - 3:00pm  Sunday and Holidays Closed    If you feel at risk of immediate harm, go directly to the Emergency Department.    Client has reviewed and agreed to the above plan.    Nathaniel Lombardi, PhD  November 20, 2017  Behavioral Health Fellow      ______________________________    ________  Patient Signature       Date    ______________________________    ________  Provider Signature       Date    ______________________________                ________  Supervisor Co-Signature      Date

## 2017-11-20 NOTE — MR AVS SNAPSHOT
After Visit Summary   11/20/2017    Mary Gill    MRN: [id number]           Patient Information     Date Of Birth          1965        Visit Information        Provider Department      11/20/2017 2:00 PM Lombardi, Nathaniel, PhD [facility]        Care Instructions    Treatment Plan    Client's Name: Mary Gill  YOB: 1965  Today's Date: 11/20/2017   Date Diagnostic Update Due: 11/20/2018    DSM-V Diagnoses:   296.25 Major Depressive Disorder, Single Episode, In Partial Remission  309.24 Adjustment Disorder With Anxiety     Psychosocial / Contextual Factors:   Patient is continuing to experience a number of significant life stressors.    WHODAS 2.0 TOTAL SCORES 11/18/2016   Total Score 16     PC-PTSD: 0 /4  CAGE AID: 0 /4    PHQ-9 SCORE 4/26/2017 8/2/2017 11/20/2017   Total Score 5 10 7     ELENA-7 SCORE 4/26/2017 8/2/2017 11/20/2017   Total Score 3 8 7     Anticipated treatment duration: Unknown  Agreed upon meeting frequency: Monthly    Long Term Treatment Goal(s) related to diagnosis / functional impairment(s)  Goal 1: Patient will work with this provider to develop adaptive behavioral and cognitive coping strategies for anxious/depressive/stress-related symptoms.    I will know I've met my goal when I start singing spontaneously more often.      Steps we will take to achieve your goal:    Patient will  identify and utilize behavioral and cognitive strategies to more effectively address anxious/depressive/stress-related symptoms in-the-moment.  Status: Continued: 3/8/2017     Intervention(s)  Therapist will provide support, psychoeducation and homework assignments as needed.    Goal 2: Patient will work with this provider to identify and challenge thoughts and thought patterns contributing to anxious and depressive symptoms.    I will know I've met my goal when I'm able to get back to sleep at night in a reasonable amount of time.      Steps we will take  to achieve your goal:    Patient will Identify negative self-talk and behaviors: challenge core beliefs, myths, and actions.  Status: Continued: 3/8/2017      Intervention(s)  Therapist will provide support, psychoeducation and homework assignments as needed.    If you need additional support and care during times that your therapist or PCP are not available, here are some additional resources for you:    Help in a Crisis:    [facility] ([facility] Mobile Response Team)  [phone]   Crisis Connection:  [phone]  [facility] Multilingual Crisis Line:  [phone]    Ac[facility] Psychiatric Services - [facility] [place]  24 hour crisis walk-in and crisis line  [address], [place]  [phone]    [facility]   [address] [place] [address]   [phone] (for 24 hour crisis consultation)    Monday - Friday 8:00am - 7:00pm  Saturday:  11:00am - 3:00pm  Sunday and Holidays Closed    If you feel at risk of immediate harm, go directly to the Emergency Department.    Client has reviewed and agreed to the above plan.    Nathaniel Lombardi, PhD  November 20, 2017  Behavioral Health Fellow      ______________________________    ________  Patient Signature       Date    ______________________________    ________  Provider Signature       Date    ______________________________                ________  Supervisor Co-Signature      Date            Follow-ups after your visit        Who to contact     Please call your clinic at [phone] to:    Ask questions about your health    Make or cancel appointments    Discuss your medicines    Learn about your test results    Speak to your doctor   If you have compliments or concerns about an experience at your clinic, or if you wish to file a complaint, please contact [facility] Patient Relations at [phone] or email us at [email or web address] [place]         Additional Information About Your Visit        Darryl  Information     [employer] gives you secure access to your electronic health record. If you see a primary care provider, you can also send messages to your care team and make appointments. If you have questions, please call your primary care clinic.  If you do not have a primary care provider, please call [phone] and they will assist you.      [employer] is an electronic gateway that provides easy, online access to your medical records. With [employer], you can request a clinic appointment, read your test results, renew a prescription or communicate with your care team.     To access your existing account, please contact your [facility] or call [phone] for assistance.        Care EveryWhere ID     This is your Care EveryWhere ID. This could be used by other organizations to access your [place] medical records  [id number]         Blood Pressure from Last 3 Encounters:   10/20/17 108/78   09/05/17 111/78   11/15/16 124/77    Weight from Last 3 Encounters:   10/20/17 84.4 kg (186 lb)   09/05/17 86.8 kg (191 lb 6.4 oz)   11/15/16 86.6 kg (191 lb)              Today, you had the following     No orders found for display       Primary Care Provider Office Phone # Fax #    Ana Campos [phone]       [facility] [address] [facility] [address]        Equal Access to Services     MARY DELA CRUZ AH: Hadii willard ku hadasho Soshawnali, waaxda luqadaha, qaybta kaalmada adeegyada, arley monsalve. So [facility] [phone].    ATENCIÓN: Si habla español, tiene a evans disposición servicios gratuitos de asistencia lingüística. Llame al [phone].    We comply with applicable federal civil rights laws and [place] laws. We do not discriminate on the basis of race, color, national origin, age, disability, sex, sexual orientation, or gender identity.            Thank you!     Thank you for choosing [facility]  for your  care. Our goal is always to provide you with excellent care. Hearing back from our patients is one way we can continue to improve our services. Please take a few minutes to complete the written survey that you may receive in the mail after your visit with us. Thank you!             Your Updated Medication List - Protect others around you: Learn how to safely use, store and throw away your medicines at www.disposemymeds.org.          This list is accurate as of: 11/20/17  2:34 PM.  Always use your most recent med list.                   Brand Name Dispense Instructions for use Diagnosis    albuterol 108 (90 BASE) MCG/ACT Inhaler    PROAIR HFA/PROVENTIL HFA/VENTOLIN HFA    1 Inhaler    Inhale 2 puffs into the lungs 4 times daily    SOB (shortness of breath)       DiphenhydrAMINE HCl 2 % Gel    BENADRYL ITCH STOPPING    118 mL    Apply to the affected area    Bed bug bite       hydrocortisone 1 % cream    CORTAID    30 g    Apply sparingly to affected area three times daily for 14 days.    Bed bug bite       hydrOXYzine 25 MG tablet    ATARAX    30 tablet    Take 1-2 tablets (25-50 mg) by mouth nightly as needed for anxiety (insomnia)    Anxiety       SUMAtriptan 50 MG tablet    IMITREX    18 tablet    Take 1 tablet (50 mg) by mouth at onset of headache for migraine May repeat dose in 2 hours.  Do not exceed 200 mg in 24 hours    Intractable chronic migraine without aura and with status migrainosus       ZYRTEC ALLERGY 10 MG Caps   Generic drug:  cetirizine HCl      Take  by mouth.

## 2017-11-20 NOTE — PROGRESS NOTES
Behavioral Health Diagnostic Assessment Update:  Meeting was: Scheduled  Others present: None  Meeting lasted: 40 Minutes  Client was: On time  Date of Initial Diagnostic Assessment: 11/18/2016    Assessment Summary:   Diagnostic assessment update completed today. The patient is a 51 year old American female who was referred for mental health services for help with anxious and depressive symptoms. Based on the patient's report of symptoms, she continues to meet criteria for Major Depressive Disorder, Single Episode (In Partial Remission) and Adjustment Disorder with Anxiety. The patient's mental health concerns continue to affect her ability to function in academic settings and have been causing clinically significant distress. The patient is also struggling with ongoing stress associated with her education, a new work position, financial strain/stress, and stress associated with family members' health concerns. Based on the patient's reported symptoms and impact on functioning, the plan for the patient is to continue meeting with this provider for ongoing individual psychotherapy.    Diagnosis (DSM-5):  296.25 Major Depressive Disorder, Single Episode, In Partial Remission  309.24 Adjustment Disorder With Anxiety     Recommendations & Plan:     Treatment plan updated today.  Next treatment plan update due on 2/20/2018.    Goals for therapy denoted in the patient's treatment plan.     Mental Health Screening Questionnaires:  PHQ-9 SCORE 4/26/2017 8/2/2017 11/20/2017   Total Score 5 10 7     ELENA-7 SCORE 4/26/2017 8/2/2017 11/20/2017   Total Score 3 8 7     PTSD-PC: 0/4  CAGE AID: 0/4     WHODAS 2.0 TOTAL SCORES 11/18/2016 11/20/2017   Total Score 16 16   Complete responses are available for review in the flow-sheet.     Current Presenting Problems or Complaints (including patient perception of problem and external factors contributing to current dilemma):   Patient reports ongoing anxious and depressive symptoms  "exacerbated by ongoing, multifaceted psychosocial stressors. Patient reports feeling her symptoms have ameliorated to some extent over the course of psychotherapy, but notes \"[she thinks she is] overwhelmed with various obligations and roles\" coupled with limited family support.     Review of Symptoms:  Depression: Sleep, Energy, Appetite, Concentration, Irritability  Estefany: None  Psychosis: None  Anxiety: Worries, Nervousness, Difficulties relaxing or controlling worry, Sense of impending doom  Post Traumatic Stress Disorder: None    Updated Life History/Circumstances:   Patient reports a number of updates regarding life circumstances since initial diagnostic assessment. Identifies her recent employment as a nurse educator, her continued work towards completing her Bachelor's degree, and her 's continued recovery from his stroke in January 2017 as the primary changes/updates. Of note, also reports she will likely be transitioning to a different insurance plan/provider in the near future as a result of recently obtaining employment.     Current Substance Use:   Patient reports continued infrequent/occasional alcohol use, limiting herself to 1-2 alcoholic drinks per occasion. Denies additional substance use.     Updated Health History/Family Health History:   Patient reports no updates/changes regarding personal health history since her initial diagnostic assessment. Reports a number of updates concerning family health since initial diagnostic assessment, namely that her mother experienced a heart attacked approximately one year ago and her father has been diagnosed with chronic kidney disease as well as prostate cancer \"that has metastasized in his spine.\"     Patient Active Problem List   Diagnosis     Other abnormal Papanicolaou smear of cervix and cervical HPV(795.09)     Diffuse cystic mastopathy     Genital herpes     Routine general medical examination at a health care facility     Acute bronchitis     " "Headache     Disease of lung     Pulmonary nodules     Major depressive disorder, single episode, in partial remission (H)     Adjustment disorder with anxiety     Current Outpatient Prescriptions   Medication     SUMAtriptan (IMITREX) 50 MG tablet     hydrOXYzine (ATARAX) 25 MG tablet     albuterol (PROAIR HFA, PROVENTIL HFA, VENTOLIN HFA) 108 (90 BASE) MCG/ACT inhaler     DiphenhydrAMINE HCl (BENADRYL ITCH STOPPING) 2 % GEL     hydrocortisone (CORTAID) 1 % cream     cetirizine HCl (ZYRTEC ALLERGY) 10 MG CAPS     No current facility-administered medications for this visit.      Cultural Factors:   Patient reports she continues to identify as Islam.     Mental Status Exam:  Appearance:  No apparent distress, Casually dressed, Well groomed, Dressed appropriately for weather and Appears stated age  Behavior/Relationship to Examiner/Demeanor:  Cooperative, Engaged and Pleasant  Psychomotor Activity: Unremarkable  Speech rate:  Normal  Speech volume:  Normal  Speech coherence:  Normal  Speech spontaneity:  Normal  Mood (subjective report):  pleasant  Affect (objective appearance):  Appropriate/mood-congruent  Eye Contact: good  Thought Process (Associations):  Logical, Linear and Goal directed  Thought process (Rate):  Normal  Abnormal Perception:  None  Sensorium:  Alert, Oriented to person, Oriented to place, Oriented to date/time and Oriented to situation  Attention/Concentration:  Normal  Insight:  Good  Judgment:  Good    Suicide Assessment:  Recent suicidal thoughts: No  Past suicidal thoughts: No  Any attempts in the past: No  Any family/friends/loved ones commit suicide: Yes (Shared during initial diagnostic assessment that her cousin committed suicide in the past and her daughter \"had an attempt\" approximately three to four years ago)  Plan or considering various methods: No  Access to guns: No  Protective factors: no h/o suicide attempt, no plan or intent, no h/o risky impulsive behavior, h/o seeking help " "when needed, future oriented, commitment to family, stable housing and good job situation  Verbal contract for safety: Yes    Non-Suicidal Self Injurious Behavior:   None    Violence/Homicide Risk Assessment:  Problems with anger management: No  History of violence: No  History of significant damage to property: No  Threat made to harm or kill someone: No  Verbal contract for safety: N/A    Safety Plan:   Mary was provided with the phone number for emergency mental health services and was encouraged to call these local crisis numbers, 911, or visit a local emergency room if thoughts of suicide or homicide were to arise and/or if she were to be in acute distress. The patient agreed to utilize these services as indicated if the need were to arise. Mary denied past or current suicidal or homicidal ideation, intent, or plan, denied a history of suicide attempts/self-harming behaviors, and denied concerns regarding safety. Based on these factors, Mary is considered to be sustainable as an outpatient at this time.     NOTE: Diagnostic assessment update complete.    Primary Care PTSD Screen  In your life, have you ever had any experience that was so frightening, horrible or upsetting that, in the past month, you...    1. Have had nightmares about it or thought about it when you did not want to? No  2. Tried hard not to think about it or went out of your way to avoid situations that remind you of it? No  3. Were constantly on guard, watchful, or easily startled? No  4. Felt numb or detached from others, activities, or your surroundings? No    Current research suggests that the results of the PC-PTSD should be considered \"positive\" if a patient answers \"yes\" to any (3) items.      Nathaniel Lombardi, Ph.D.  Behavioral Health Fellow      References    FROYLAN Acosta, JOSIE Hall, Kimerling, R., López RVernP., JOHN MartinezS., CLAIRE Olivia, FROYLAN Maria, SUSU Smith., Gamino, J.I. (2004). The primary care PTSD screen " (PC-PTSD): development and operating characteristics. Primary Care Psychiatry, 9, 9-14.

## 2017-11-21 ASSESSMENT — ANXIETY QUESTIONNAIRES: GAD7 TOTAL SCORE: 7

## 2017-11-27 ENCOUNTER — OFFICE VISIT (OUTPATIENT)
Dept: URGENT CARE | Facility: URGENT CARE | Age: 52
End: 2017-11-27
Payer: COMMERCIAL

## 2017-11-27 VITALS — BODY MASS INDEX: 33.66 KG/M2 | TEMPERATURE: 98 F | WEIGHT: 190 LBS | HEART RATE: 76 BPM | RESPIRATION RATE: 20 BRPM

## 2017-11-27 DIAGNOSIS — H10.33 ACUTE BACTERIAL CONJUNCTIVITIS OF BOTH EYES: Primary | ICD-10-CM

## 2017-11-27 PROCEDURE — 99213 OFFICE O/P EST LOW 20 MIN: CPT | Performed by: PHYSICIAN ASSISTANT

## 2017-11-27 RX ORDER — TOBRAMYCIN 3 MG/ML
1 SOLUTION/ DROPS OPHTHALMIC EVERY 4 HOURS
Qty: 1 BOTTLE | Refills: 0 | Status: SHIPPED | OUTPATIENT
Start: 2017-11-27 | End: 2017-12-04

## 2017-11-27 NOTE — MR AVS SNAPSHOT
After Visit Summary   11/27/2017    Mary Gill    MRN: [id number]           Patient Information     Date Of Birth          1965        Visit Information        Provider Department      11/27/2017 8:20 PM Amador Thompson PA-C [facility]        Today's Diagnoses     Acute bacterial conjunctivitis of both eyes    -  1       Follow-ups after your visit        Who to contact     If you have questions or need follow up information about today's clinic visit or your schedule please contact [facility] directly at [phone].  Normal or non-critical lab and imaging results will be communicated to you by [facility]Chart, letter or phone within 4 business days after the clinic has received the results. If you do not hear from us within 7 days, please contact the clinic through [facility]Chart or phone. If you have a critical or abnormal lab result, we will notify you by phone as soon as possible.  Submit refill requests through [facility] or call your pharmacy and they will forward the refill request to us. Please allow 3 business days for your refill to be completed.          Additional Information About Your Visit        MyChart Information     [facility] gives you secure access to your electronic health record. If you see a primary care provider, you can also send messages to your care team and make appointments. If you have questions, please call your primary care clinic.  If you do not have a primary care provider, please call [phone] and they will assist you.        Care EveryWhere ID     This is your Care EveryWhere ID. This could be used by other organizations to access your [facility] medical records  [id number]        Your Vitals Were     Pulse Temperature Respirations BMI (Body Mass Index)          76 98  F (36.7  C) (Oral) 20 33.66 kg/m2         Blood Pressure from Last 3 Encounters:   10/20/17 108/78   09/05/17 111/78   11/15/16 124/77    Weight  from Last 3 Encounters:   11/27/17 190 lb (86.2 kg)   10/20/17 186 lb (84.4 kg)   09/05/17 191 lb 6.4 oz (86.8 kg)              Today, you had the following     No orders found for display         Today's Medication Changes          These changes are accurate as of: 11/27/17  8:38 PM.  If you have any questions, ask your nurse or doctor.               Start taking these medicines.        Dose/Directions    tobramycin 0.3 % ophthalmic solution   Commonly known as:  TOBREX   Used for:  Acute bacterial conjunctivitis of both eyes        Dose:  1 drop   Apply 1 drop to eye every 4 hours for 7 days   Quantity:  1 Bottle   Refills:  0            Where to get your medicines      Some of these will need a paper prescription and others can be bought over the counter.  Ask your nurse if you have questions.     Bring a paper prescription for each of these medications     tobramycin 0.3 % ophthalmic solution                Primary Care Provider Office Phone # Fax #    Ana Campos [phone]       [facility] [address] [facility] [address]        Equal Access to Services     [facility]: Hadii willard ratliff hadasho Sogray, waaxda luqadaha, qaybta kaalmada adeangella, arley monsalve. So [facility] [phone].    ATENCIÓN: Si habla español, tiene a evans disposición servicios gratuitos de asistencia lingüística. Llame al [phone].    We comply with applicable federal civil rights laws and [place] laws. We do not discriminate on the basis of race, color, national origin, age, disability, sex, sexual orientation, or gender identity.            Thank you!     Thank you for choosing [facility]  for your care. Our goal is always to provide you with excellent care. Hearing back from our patients is one way we can continue to improve our services. Please take a few minutes to complete the written survey that you may receive in the mail after  your visit with us. Thank you!             Your Updated Medication List - Protect others around you: Learn how to safely use, store and throw away your medicines at www.disposemymeds.org.          This list is accurate as of: 11/27/17  8:38 PM.  Always use your most recent med list.                   Brand Name Dispense Instructions for use Diagnosis    albuterol 108 (90 BASE) MCG/ACT Inhaler    PROAIR HFA/PROVENTIL HFA/VENTOLIN HFA    1 Inhaler    Inhale 2 puffs into the lungs 4 times daily    SOB (shortness of breath)       DiphenhydrAMINE HCl 2 % Gel    BENADRYL ITCH STOPPING    118 mL    Apply to the affected area    Bed bug bite       hydrocortisone 1 % cream    CORTAID    30 g    Apply sparingly to affected area three times daily for 14 days.    Bed bug bite       hydrOXYzine 25 MG tablet    ATARAX    30 tablet    Take 1-2 tablets (25-50 mg) by mouth nightly as needed for anxiety (insomnia)    Anxiety       SUMAtriptan 50 MG tablet    IMITREX    18 tablet    Take 1 tablet (50 mg) by mouth at onset of headache for migraine May repeat dose in 2 hours.  Do not exceed 200 mg in 24 hours    Intractable chronic migraine without aura and with status migrainosus       tobramycin 0.3 % ophthalmic solution    TOBREX    1 Bottle    Apply 1 drop to eye every 4 hours for 7 days    Acute bacterial conjunctivitis of both eyes       ZYRTEC ALLERGY 10 MG Caps   Generic drug:  cetirizine HCl      Take  by mouth.

## 2017-11-28 NOTE — NURSING NOTE
"Chief Complaint   Patient presents with     Conjunctivitis     started today       Initial Pulse 76  Temp 98  F (36.7  C) (Oral)  Resp 20  Wt 190 lb (86.2 kg)  BMI 33.66 kg/m2 Estimated body mass index is 33.66 kg/(m^2) as calculated from the following:    Height as of 10/20/17: 5' 3\" (1.6 m).    Weight as of this encounter: 190 lb (86.2 kg).  Medication Reconciliation: complete Maulik WILKINSON    "

## 2017-11-28 NOTE — PROGRESS NOTES
SUBJECTIVE:  Chief Complaint:   Chief Complaint   Patient presents with     Conjunctivitis     started today     History of Present Illness:  Mary Gill is a 52 year old female who presents complaining of mild both eyes mattering for 1 day(s).   Onset/timing: sudden.    Associated Signs and Symptoms: eye redness  Treatment measures tried include: washing eyes  Contact wearer : no    Past Medical History:   Diagnosis Date     NO ACTIVE PROBLEMS      No Known Allergies  Social History     Social History     Marital status:      Spouse name: N/A     Number of children: N/A     Years of education: N/A     Occupational History     Not on file.     Social History Main Topics     Smoking status: Former Smoker     Quit date: 7/15/1991     Smokeless tobacco: Not on file     Alcohol use Yes      Comment: occas     Drug use: No     Sexual activity: Not on file     Other Topics Concern     Not on file     Social History Narrative         ROS:  CONSTITUTIONAL:NEGATIVE for fever, chills, change in weight  INTEGUMENTARY/SKIN: NEGATIVE for worrisome rashes, moles or lesions  EYES: POSITIVE for eye redness and mattering  ENT/MOUTH: NEGATIVE for ear, mouth and throat problems  NEURO: NEGATIVE for weakness, dizziness or paresthesias      OBJECTIVE:  Pulse 76  Temp 98  F (36.7  C) (Oral)  Resp 20  Wt 190 lb (86.2 kg)  BMI 33.66 kg/m2  General: no acute distress  Eye exam: right eye abnormal findings: conjunctivitis with erythema, discharge and matting noted, left eye abnormal findings: conjunctivitis with erythema, discharge and matting noted.  Ears: normal canals, TMs bilaterally, normal TM mobility  Nose: NORMAL - no drainage, turbinates normal in size.  Neuro: PERRLA, EOMI    ASSESSMENT/PLAN:      ICD-10-CM    1. Acute bacterial conjunctivitis of both eyes H10.33 tobramycin (TOBREX) 0.3 % ophthalmic solution     Wash hands  Follow up as needed    See orders in epic

## 2017-11-29 NOTE — PROGRESS NOTES
Preceptor Attestation:  I have reviewed and agree with the behavioral health fellow's documentation for this visit.  I did not see the patient.  Supervising Clinical Psychologist:  Karen Mccatry PSYD LP

## 2017-12-07 ENCOUNTER — TRANSFERRED RECORDS (OUTPATIENT)
Dept: HEALTH INFORMATION MANAGEMENT | Facility: CLINIC | Age: 52
End: 2017-12-07

## 2018-01-06 ENCOUNTER — HEALTH MAINTENANCE LETTER (OUTPATIENT)
Age: 53
End: 2018-01-06

## 2018-06-04 ENCOUNTER — TELEPHONE (OUTPATIENT)
Dept: PSYCHOLOGY | Facility: CLINIC | Age: 53
End: 2018-06-04

## 2018-06-04 NOTE — TELEPHONE ENCOUNTER
"This provider placed outreach call to the patient in order to assess interest in scheduling follow-up appointment. Talked with the patient. Expressed appreciation for the outreach call, though declined to schedule follow-up appointment at this time. Noted \"[she's] doing okay\" currently and has been making a consistent effort to engage in self-care. Informed the patient she would be welcome to contact the clinic with questions/concerns or to schedule a follow-up appointment with this provider moving forward should she be interested, and reminded the patient of this provider's timeline remaining on fellowship (Fellowship scheduled to conclude 9/5/2018). Expressed understanding and appreciation.    Nathaniel Lombardi, PhD,   Behavioral Health Fellow  "

## 2019-09-30 ENCOUNTER — HEALTH MAINTENANCE LETTER (OUTPATIENT)
Age: 54
End: 2019-09-30

## 2020-03-15 ENCOUNTER — HEALTH MAINTENANCE LETTER (OUTPATIENT)
Age: 55
End: 2020-03-15

## 2021-01-15 ENCOUNTER — HEALTH MAINTENANCE LETTER (OUTPATIENT)
Age: 56
End: 2021-01-15

## 2021-10-24 ENCOUNTER — HEALTH MAINTENANCE LETTER (OUTPATIENT)
Age: 56
End: 2021-10-24

## 2022-02-13 ENCOUNTER — HEALTH MAINTENANCE LETTER (OUTPATIENT)
Age: 57
End: 2022-02-13

## 2022-04-10 ENCOUNTER — HEALTH MAINTENANCE LETTER (OUTPATIENT)
Age: 57
End: 2022-04-10

## 2022-10-15 ENCOUNTER — HEALTH MAINTENANCE LETTER (OUTPATIENT)
Age: 57
End: 2022-10-15

## 2023-03-26 ENCOUNTER — HEALTH MAINTENANCE LETTER (OUTPATIENT)
Age: 58
End: 2023-03-26

## 2023-10-10 ENCOUNTER — OFFICE VISIT (OUTPATIENT)
Dept: URGENT CARE | Facility: URGENT CARE | Age: 58
End: 2023-10-10
Payer: COMMERCIAL

## 2023-10-10 VITALS
DIASTOLIC BLOOD PRESSURE: 79 MMHG | WEIGHT: 198.4 LBS | HEART RATE: 68 BPM | SYSTOLIC BLOOD PRESSURE: 124 MMHG | TEMPERATURE: 98.9 F | BODY MASS INDEX: 35.14 KG/M2 | OXYGEN SATURATION: 99 %

## 2023-10-10 DIAGNOSIS — R07.0 THROAT PAIN: Primary | ICD-10-CM

## 2023-10-10 DIAGNOSIS — J03.90 TONSILLITIS: ICD-10-CM

## 2023-10-10 LAB
DEPRECATED S PYO AG THROAT QL EIA: NEGATIVE
GROUP A STREP BY PCR: NOT DETECTED

## 2023-10-10 PROCEDURE — 99203 OFFICE O/P NEW LOW 30 MIN: CPT | Performed by: FAMILY MEDICINE

## 2023-10-10 PROCEDURE — 87651 STREP A DNA AMP PROBE: CPT | Performed by: FAMILY MEDICINE

## 2023-10-10 RX ORDER — CEFDINIR 300 MG/1
300 CAPSULE ORAL 2 TIMES DAILY
Qty: 20 CAPSULE | Refills: 0 | Status: SHIPPED | OUTPATIENT
Start: 2023-10-10 | End: 2023-10-20

## 2023-10-10 NOTE — PROGRESS NOTES
SUBJECTIVE:Mary Gill is a 57 year old female with a chief complaint of sore throat.    Onset of symptoms was day(s) ago.    Course of illness: still present.      Past Medical History:   Diagnosis Date    NO ACTIVE PROBLEMS      No Known Allergies  Social History     Tobacco Use    Smoking status: Former     Types: Cigarettes     Quit date: 7/15/1991     Years since quittin.2    Smokeless tobacco: Not on file   Substance Use Topics    Alcohol use: Yes     Comment: occas       ROS:  SKIN: no rash  GI: no vomiting    OBJECTIVE:   /79   Pulse 68   Temp 98.9  F (37.2  C) (Tympanic)   Wt 90 kg (198 lb 6.4 oz)   SpO2 99%   BMI 35.14 kg/m  GENERAL APPEARANCE: healthy, alert and no distress  EYES: EOMI,  PERRL, conjunctiva clear  HENT: ear canals and TM's normal.  Nose normal.  Pharynx erythematous with some exudate noted.  RESP: lungs clear to auscultation - no rales, rhonchi or wheezes  SKIN: no suspicious lesions or rashes    Rapid Strep test is negative; await throat culture results.      ICD-10-CM    1. Throat pain  R07.0 Streptococcus A Rapid Screen w/Reflex to PCR - Clinic Collect     Group A Streptococcus PCR Throat Swab      2. Tonsillitis  J03.90 cefdinir (OMNICEF) 300 MG capsule        Symptomatic treat with gargles, lozenges, and OTC analgesic as needed.  Follow-up with primary clinic if not improving.

## 2024-05-26 ENCOUNTER — HEALTH MAINTENANCE LETTER (OUTPATIENT)
Age: 59
End: 2024-05-26

## 2025-06-14 ENCOUNTER — HEALTH MAINTENANCE LETTER (OUTPATIENT)
Age: 60
End: 2025-06-14

## (undated) RX ORDER — FENTANYL CITRATE 50 UG/ML
INJECTION, SOLUTION INTRAMUSCULAR; INTRAVENOUS
Status: DISPENSED
Start: 2017-10-20